# Patient Record
Sex: FEMALE | Race: BLACK OR AFRICAN AMERICAN | NOT HISPANIC OR LATINO | Employment: FULL TIME | URBAN - METROPOLITAN AREA
[De-identification: names, ages, dates, MRNs, and addresses within clinical notes are randomized per-mention and may not be internally consistent; named-entity substitution may affect disease eponyms.]

---

## 2022-05-13 ENCOUNTER — OFFICE VISIT (OUTPATIENT)
Dept: INTERNAL MEDICINE CLINIC | Facility: CLINIC | Age: 57
End: 2022-05-13
Payer: COMMERCIAL

## 2022-05-13 ENCOUNTER — TELEPHONE (OUTPATIENT)
Dept: ADMINISTRATIVE | Facility: OTHER | Age: 57
End: 2022-05-13

## 2022-05-13 ENCOUNTER — APPOINTMENT (OUTPATIENT)
Dept: LAB | Facility: CLINIC | Age: 57
End: 2022-05-13
Payer: COMMERCIAL

## 2022-05-13 VITALS
BODY MASS INDEX: 30.26 KG/M2 | HEART RATE: 90 BPM | DIASTOLIC BLOOD PRESSURE: 88 MMHG | OXYGEN SATURATION: 99 % | SYSTOLIC BLOOD PRESSURE: 146 MMHG | HEIGHT: 63 IN | TEMPERATURE: 97.9 F | WEIGHT: 170.8 LBS | RESPIRATION RATE: 18 BRPM

## 2022-05-13 DIAGNOSIS — Z11.4 SCREENING FOR HIV (HUMAN IMMUNODEFICIENCY VIRUS): ICD-10-CM

## 2022-05-13 DIAGNOSIS — E78.00 HYPERCHOLESTEREMIA: ICD-10-CM

## 2022-05-13 DIAGNOSIS — M54.2 CHRONIC NECK PAIN: ICD-10-CM

## 2022-05-13 DIAGNOSIS — M54.50 CHRONIC BILATERAL LOW BACK PAIN WITHOUT SCIATICA: ICD-10-CM

## 2022-05-13 DIAGNOSIS — H40.053 OCULAR HYPERTENSION, BILATERAL: ICD-10-CM

## 2022-05-13 DIAGNOSIS — G89.29 CHRONIC BILATERAL LOW BACK PAIN WITHOUT SCIATICA: ICD-10-CM

## 2022-05-13 DIAGNOSIS — Z11.59 NEED FOR HEPATITIS C SCREENING TEST: ICD-10-CM

## 2022-05-13 DIAGNOSIS — G89.29 CHRONIC NECK PAIN: ICD-10-CM

## 2022-05-13 DIAGNOSIS — E66.9 OBESITY (BMI 30-39.9): ICD-10-CM

## 2022-05-13 DIAGNOSIS — I10 ESSENTIAL HYPERTENSION: Primary | ICD-10-CM

## 2022-05-13 DIAGNOSIS — Z12.31 ENCOUNTER FOR SCREENING MAMMOGRAM FOR BREAST CANCER: ICD-10-CM

## 2022-05-13 DIAGNOSIS — R10.12 LEFT UPPER QUADRANT ABDOMINAL PAIN: ICD-10-CM

## 2022-05-13 LAB
ALBUMIN SERPL BCP-MCNC: 3.8 G/DL (ref 3.5–5)
ALP SERPL-CCNC: 84 U/L (ref 46–116)
ALT SERPL W P-5'-P-CCNC: 30 U/L (ref 12–78)
ANION GAP SERPL CALCULATED.3IONS-SCNC: 2 MMOL/L (ref 4–13)
AST SERPL W P-5'-P-CCNC: 25 U/L (ref 5–45)
BASOPHILS # BLD MANUAL: 0 THOUSAND/UL (ref 0–0.1)
BASOPHILS NFR MAR MANUAL: 0 % (ref 0–1)
BILIRUB SERPL-MCNC: 0.45 MG/DL (ref 0.2–1)
BUN SERPL-MCNC: 9 MG/DL (ref 5–25)
CALCIUM SERPL-MCNC: 9.6 MG/DL (ref 8.3–10.1)
CHLORIDE SERPL-SCNC: 108 MMOL/L (ref 100–108)
CHOLEST SERPL-MCNC: 185 MG/DL
CO2 SERPL-SCNC: 30 MMOL/L (ref 21–32)
CREAT SERPL-MCNC: 1.02 MG/DL (ref 0.6–1.3)
EOSINOPHIL # BLD MANUAL: 0.03 THOUSAND/UL (ref 0–0.4)
EOSINOPHIL NFR BLD MANUAL: 1 % (ref 0–6)
ERYTHROCYTE [DISTWIDTH] IN BLOOD BY AUTOMATED COUNT: 14.5 % (ref 11.6–15.1)
GFR SERPL CREATININE-BSD FRML MDRD: 61 ML/MIN/1.73SQ M
GLUCOSE P FAST SERPL-MCNC: 89 MG/DL (ref 65–99)
HCT VFR BLD AUTO: 39.7 % (ref 34.8–46.1)
HCV AB SER QL: NORMAL
HDLC SERPL-MCNC: 54 MG/DL
HGB BLD-MCNC: 12.8 G/DL (ref 11.5–15.4)
LDLC SERPL CALC-MCNC: 116 MG/DL (ref 0–100)
LYMPHOCYTES # BLD AUTO: 1.39 THOUSAND/UL (ref 0.6–4.47)
LYMPHOCYTES # BLD AUTO: 43 % (ref 14–44)
MCH RBC QN AUTO: 27.4 PG (ref 26.8–34.3)
MCHC RBC AUTO-ENTMCNC: 32.2 G/DL (ref 31.4–37.4)
MCV RBC AUTO: 85 FL (ref 82–98)
MONOCYTES # BLD AUTO: 0.03 THOUSAND/UL (ref 0–1.22)
MONOCYTES NFR BLD: 1 % (ref 4–12)
NEUTROPHILS # BLD MANUAL: 1.78 THOUSAND/UL (ref 1.85–7.62)
NEUTS SEG NFR BLD AUTO: 55 % (ref 43–75)
NONHDLC SERPL-MCNC: 131 MG/DL
PLATELET # BLD AUTO: 303 THOUSANDS/UL (ref 149–390)
PLATELET BLD QL SMEAR: ADEQUATE
PMV BLD AUTO: 12 FL (ref 8.9–12.7)
POTASSIUM SERPL-SCNC: 3.9 MMOL/L (ref 3.5–5.3)
PROT SERPL-MCNC: 7.6 G/DL (ref 6.4–8.2)
RBC # BLD AUTO: 4.68 MILLION/UL (ref 3.81–5.12)
SODIUM SERPL-SCNC: 140 MMOL/L (ref 136–145)
TRIGL SERPL-MCNC: 74 MG/DL
TSH SERPL DL<=0.05 MIU/L-ACNC: 1.26 UIU/ML (ref 0.45–4.5)
WBC # BLD AUTO: 3.23 THOUSAND/UL (ref 4.31–10.16)

## 2022-05-13 PROCEDURE — 99204 OFFICE O/P NEW MOD 45 MIN: CPT | Performed by: INTERNAL MEDICINE

## 2022-05-13 PROCEDURE — 80061 LIPID PANEL: CPT

## 2022-05-13 PROCEDURE — 85027 COMPLETE CBC AUTOMATED: CPT

## 2022-05-13 PROCEDURE — 86803 HEPATITIS C AB TEST: CPT

## 2022-05-13 PROCEDURE — 87389 HIV-1 AG W/HIV-1&-2 AB AG IA: CPT

## 2022-05-13 PROCEDURE — 36415 COLL VENOUS BLD VENIPUNCTURE: CPT

## 2022-05-13 PROCEDURE — 80053 COMPREHEN METABOLIC PANEL: CPT

## 2022-05-13 PROCEDURE — 84443 ASSAY THYROID STIM HORMONE: CPT

## 2022-05-13 PROCEDURE — 85007 BL SMEAR W/DIFF WBC COUNT: CPT

## 2022-05-13 RX ORDER — LOSARTAN POTASSIUM AND HYDROCHLOROTHIAZIDE 12.5; 5 MG/1; MG/1
1 TABLET ORAL DAILY
Qty: 90 TABLET | Refills: 3 | Status: SHIPPED | OUTPATIENT
Start: 2022-05-13 | End: 2022-06-24

## 2022-05-13 NOTE — TELEPHONE ENCOUNTER
Upon review of the In Basket request and the patient's chart, initial outreach has been made via fax, please see Contacts section for details       Thank you  Oscar Ice

## 2022-05-13 NOTE — LETTER
Procedure Request Form: Colonoscopy      Date Requested: 22  Patient: Alma Aguilar  Patient : 1965   Referring Provider: Dinora Carter, MD        Date of Procedure ______________________________       The above patient has informed us that they have completed their   most recent Colonoscopy at your facility  Please complete   this form and attach all corresponding procedure reports/results  Comments __________________________________________________________  ____________________________________________________________________  ____________________________________________________________________  ____________________________________________________________________    Facility Completing Procedure _________________________________________    Form Completed By (print name) _______________________________________      Signature __________________________________________________________      These reports are needed for  compliance  Please fax this completed form and a copy of the procedure report to our office located at [place] [address] as soon as possible to [phone] gisele Peralta: Phone [phone]    We thank you for your assistance in treating our mutual patient

## 2022-05-13 NOTE — TELEPHONE ENCOUNTER
----- Message from [facility] sent at 5/13/2022 11:21 AM EDT -----  05/13/22 11:21 AM    Hello, our patient Nessa Velásquez has had CRC: Colonoscopy completed/performed  Please assist in updating the patient chart by making an External outreach to [facility] facility located in [place]  The date of service is 08/2020      Thank you,  [address]

## 2022-05-13 NOTE — PROGRESS NOTES
INTERNAL MEDICINE OFFICE VISIT  [facility]  [address], [facility], [facility]  Tel: [phone]      NAME: David Rivera  AGE: 65 y o  SEX: female  : 1965   MRN: [id number]    DATE: 2022  TIME: 10:23 AM      Assessment and Plan:  1  Essential hypertension   patient was started on losartan /hydrochlorothiazide as she has not been taking the medication for a long time  Her blood pressure has been running high but she ran out of her medication and has not seen the doctor for year  She was told to take her medications regularly and continue to monitor her blood pressure  I will see her back in a month  - losartan-hydrochlorothiazide (HYZAAR) 50-12 5 mg per tablet; Take 1 tablet by mouth in the morning  Dispense: 90 tablet; Refill: 3    2  Hypercholesteremia  Continue a low fat diet and check a lipid profile  - CBC and differential; Future  - Comprehensive metabolic panel; Future  - Lipid panel; Future  - TSH, 3rd generation; Future    3  Left upper quadrant abdominal pain   will get back to her with the results of the ultrasound  - US abdomen limited; Future    4  Chronic neck pain    - Ambulatory Referral to Physical Therapy; Future    5  Chronic bilateral low back pain without sciatica    - Ambulatory Referral to Physical Therapy; Future    6  Ocular hypertension, bilateral    - Ambulatory Referral to Ophthalmology; Future    7  Obesity (BMI 30-39  9)  BMI Counseling: Body mass index is 30 26 kg/m²  The BMI is above normal  Nutrition recommendations include decreasing portion sizes, encouraging healthy choices of fruits and vegetables and moderation in carbohydrate intake  Exercise recommendations include moderate physical activity 150 minutes/week  Rationale for BMI follow-up plan is due to patient being overweight or obese  Depression Screening and Follow-up Plan: Patient was screened for depression during today's encounter   They screened negative with a PHQ-2 score of 0         8  Screening for HIV (human immunodeficiency virus)    - HIV 1/2 Antigen/Antibody (4th Generation) w Reflex [facility]; Future    9  Need for hepatitis C screening test    - Hepatitis C Antibody ([facility]); Future    10  Encounter for screening mammogram for breast cancer    - Mammo screening bilateral w 3d & cad; Future      - Counseling Documentation: patient was counseled regarding: diagnostic results, instructions for management, risk factor reductions, prognosis, patient and family education, risks and benefits of treatment options and importance of compliance with treatment  - Medication Side Effects: Adverse side effects of medications were reviewed with the patient/guardian today  Return for follow up visit in  1 month or earlier, if needed  Chief Complaint:  Chief Complaint   Patient presents with    New Patient Visit         History of Present Illness:    this is a new patient was here to establish  She has not seen a doctor for years   Her blood pressure is high as she has not been taking her medication which she was taking in the past     She is not taking anything for the cholesterol but it seems that it was high  She has not had blood work for [employer]   She complains of pain in her left upper abdomen for a while  It comes and goes and has become more frequent now  It is not related to food and is not associated with nausea, vomiting or diarrhea  She complains of pain in her neck and lower back   She needs to see the ophthalmologist for ocular hypertension   She needs to lose weight      Active Problem List:  Patient Active Problem List   Diagnosis    Essential hypertension    Hypercholesteremia    Ocular hypertension, bilateral    Left upper quadrant abdominal pain    Chronic neck pain    Chronic bilateral low back pain without sciatica    Obesity (BMI 30-39  9)         Past Medical History:  Past Medical History:   Diagnosis Date  Hyperlipidemia 2021    Hypertension 1998         Past Surgical History:  History reviewed  No pertinent surgical history        Family History:  Family History   Problem Relation Age of Onset    Hypertension Mother    [facility] Lupus Mother     Seizures Mother    [facility] Glaucoma Mother     Stomach cancer Sister     Alzheimer's disease Maternal Grandmother     No Known Problems Paternal Grandmother          Social History:  Social History     Socioeconomic History    Marital status: /Civil Union     Spouse name: None    Number of children: None    Years of education: None    Highest education level: None   Occupational History    None   Tobacco Use    Smoking status: Never Smoker    Smokeless tobacco: Never Used   Vaping Use    Vaping Use: None   Substance and Sexual Activity    Alcohol use: Never    Drug use: Never    Sexual activity: None   Other Topics Concern    None   Social History Narrative    None     Social Determinants of Health     Financial Resource Strain: Not on file   Food Insecurity: Not on file   Transportation Needs: Not on file   Physical Activity: Not on file   Stress: Not on file   Social Connections: Not on file   Intimate Partner Violence: Not on file   Housing Stability: Not on file         Allergies:  No Known Allergies      Medications:    Current Outpatient Medications:     losartan-hydrochlorothiazide (HYZAAR) 50-12 5 mg per tablet, Take 1 tablet by mouth in the morning , Disp: 90 tablet, Rfl: 3      The following portions of the patient's history were reviewed and updated as appropriate: past medical history, past surgical history, family history, social history, allergies, current medications and active problem list       Review of Systems:  Constitutional: Denies fever, chills, weight gain, weight loss, fatigue  Eyes: Denies eye redness, eye discharge, double vision, change in visual acuity  ENT: Denies hearing loss, tinnitus, sneezing, nasal congestion, nasal discharge, sore throat   Respiratory: Denies cough, expectoration, hemoptysis, shortness of breath, wheezing  Cardiovascular: Denies chest pain, palpitations, lower extremity swelling, orthopnea, PND  Gastrointestinal:  Complains of left upper abdominal pain, denies heartburn, nausea, vomiting, hematemesis, diarrhea, bloody stools  Genito-Urinary: Denies dysuria, frequency, difficulty in micturition, nocturia, incontinence  Musculoskeletal:  Has neck pain, back pain  Neurologic: Denies confusion, lightheadedness, syncope, headache, focal weakness, sensory changes, seizures  Endocrine: Denies polyuria, polydipsia, temperature intolerance  Allergy and Immunology: Denies hives, insect bite sensitivity  Hematological and Lymphatic: Denies bleeding problems, swollen glands   Psychological: Denies depression, suicidal ideation, anxiety, panic, mood swings  Dermatological: Denies pruritus, rash, skin lesion changes      Vitals:  Vitals:    05/13/22 1000   BP: 146/88   Pulse: 90   Resp: 18   Temp: 97 9 °F (36 6 °C)   SpO2: 99%       Body mass index is 30 26 kg/m²  Weight (last 2 days)     Date/Time Weight    05/13/22 1000 77 5 (170 8)            Physical Examination:  General: Patient is not in acute distress  Awake, alert, responding to commands  No weight gain or loss  Head: Normocephalic  Atraumatic  Eyes: Conjunctiva and lids with no swelling, erythema or discharge  Both pupils normal sized, round and reactive to light  Sclera nonicteric  ENT: External examination of nose and ear normal  Otoscopic examination shows translucent tympanic membranes with patent canals without erythema  Oropharynx moist with no erythema, edema, exudate or lesions  Neck: Supple  JVP not raised  Trachea midline  No masses  No thyromegaly  Lungs: No signs of increased work of breathing or respiratory distress  Bilateral bronchovascular breath sounds with no crackles or rhonchi  Chest wall: No tenderness  Cardiovascular: Normal PMI   No thrills  Regular rate and rhythm  S1 and S2 normal  No murmur, rub or gallop  Gastrointestinal: Abdomen soft, nontender  No guarding or rigidity  Liver and spleen not palpable  Bowel sounds present  Neurologic: Cranial nerves II-XII intact  Cortical functions normal  Motor system - Reflexes 2+ and symmetrical  Sensations normal  Musculoskeletal: Gait normal  No joint tenderness  Integumentary: Skin normal with no rash or lesions  Lymphatic: No palpable lymph nodes in neck, axilla or groin  Extremities: No clubbing, cyanosis, edema or varicosities  Psychological: Judgement and insight normal  Mood and affect normal      Laboratory Results:  CBC with diff:   No results found for: WBC, RBC, HGB, HCT, MCV, MCH, RDW, PLT    CMP:  No results found for: CREATININE, BUN, NA, K, CL, CO2, GLUCOSE, PROT, ALKPHOS, ALT, AST, BILIDIR    No results found for: HGBA1C, MG, PHOS    No results found for: TROPONINI, CKMB, CKTOTAL    Lipid Profile:   No results found for: CHOL  No results found for: HDL  No results found for: LDLCALC  No results found for: TRIG    Imaging Results:  No image results found         Health Maintenance:  Health Maintenance   Topic Date Due    Hepatitis C Screening  Never done    HIV Screening  Never done    BMI: Followup Plan  Never done    Annual Physical  Never done    DTaP,Tdap,and Td Vaccines (1 - Tdap) Never done    Cervical Cancer Screening  Never done    Breast Cancer Screening: Mammogram  Never done    Colorectal Cancer Screening  Never done    COVID-19 Vaccine (3 - Booster for Moderna series) 10/13/2021    Influenza Vaccine (Season Ended) 09/01/2022    Depression Screening  05/13/2023    BMI: Adult  05/13/2023    Pneumococcal Vaccine: Pediatrics (0 to 5 Years) and At-Risk Patients (6 to 59 Years)  Aged Out    HIB Vaccine  Aged Out    Hepatitis B Vaccine  Aged Out    IPV Vaccine  Aged Out    Hepatitis A Vaccine  Aged Out    Meningococcal ACWY Vaccine  Aged Out    HPV Vaccine  Aged Out     Immunization History   Administered Date(s) Administered    COVID-19 MODERNA VACC 0 5 ML IM 04/15/2021, 05/13/2021         Johanna Cruz MD  5/13/2022,10:23 AM

## 2022-05-13 NOTE — LETTER
Procedure Request Form: Colonoscopy      Date Requested: 22  Patient: Ingrid Dailey  Patient : 1965   Referring Provider: Alma Chung MD        Date of Procedure ____2020________       The above patient has informed us that they have completed their   most recent Colonoscopy at your facility  Please complete   this form and attach all corresponding procedure reports/results  Comments __________________________________________________________  ____________________________________________________________________  ____________________________________________________________________  ____________________________________________________________________    Facility Completing Procedure _________________________________________    Form Completed By (print name) _______________________________________      Signature __________________________________________________________      These reports are needed for  compliance  Please fax this completed form and a copy of the procedure report to our office located at [place] [address] as soon as possible to [phone] attention Wally Conway: Phone [phone]    We thank you for your assistance in treating our mutual patient

## 2022-05-15 LAB — HIV 1+2 AB+HIV1 P24 AG SERPL QL IA: NORMAL

## 2022-05-23 NOTE — TELEPHONE ENCOUNTER
As a final attempt, a third outreach has been made via telephone call  The outcome of the telephone call was a fax request form to be sent to Office  Please see Contacts section for details  This encounter will be closed and completed by end of day  Should we receive the requested information because of previous outreach attempts, the requested patient's chart will be updated appropriately       Thank you  Celso Lincoln

## 2022-05-24 NOTE — TELEPHONE ENCOUNTER
Upon review of the In Basket request we were informed it may take 2-10 business days to receive requested information  Any additional questions or concerns should be emailed to the Practice Liaisons via [email or web address] email, please do not reply via In Basket      Thank you  Syeda Roach

## 2022-05-25 NOTE — TELEPHONE ENCOUNTER
Upon review of the In Basket request we received a fax requesting a signed HIPAA/MAXINE before they will release medical records  Any additional questions or concerns should be emailed to the Practice Liaisons via [email or web address] email, please do not reply via In Basket      Thank you  Tamia Machuca

## 2022-05-26 ENCOUNTER — HOSPITAL ENCOUNTER (OUTPATIENT)
Dept: MAMMOGRAPHY | Facility: CLINIC | Age: 57
Discharge: HOME | End: 2022-05-26
Payer: COMMERCIAL

## 2022-05-26 DIAGNOSIS — Z12.31 ENCOUNTER FOR SCREENING MAMMOGRAM FOR BREAST CANCER: ICD-10-CM

## 2022-05-26 PROCEDURE — 77063 BREAST TOMOSYNTHESIS BI: CPT

## 2022-05-26 PROCEDURE — 77067 SCR MAMMO BI INCL CAD: CPT

## 2022-06-03 ENCOUNTER — HOSPITAL ENCOUNTER (OUTPATIENT)
Dept: ULTRASOUND IMAGING | Facility: HOSPITAL | Age: 57
Discharge: HOME | End: 2022-06-03
Attending: INTERNAL MEDICINE
Payer: COMMERCIAL

## 2022-06-03 DIAGNOSIS — R10.12 LEFT UPPER QUADRANT ABDOMINAL PAIN: ICD-10-CM

## 2022-06-03 PROCEDURE — 76705 ECHO EXAM OF ABDOMEN: CPT

## 2022-06-09 ENCOUNTER — TELEPHONE (OUTPATIENT)
Dept: INTERNAL MEDICINE CLINIC | Facility: CLINIC | Age: 57
End: 2022-06-09

## 2022-06-09 NOTE — TELEPHONE ENCOUNTER
----- Message from Zachary Gómez DO sent at 6/8/2022  4:58 PM EDT -----  Small 3 mm stone up in left kidney   No obstructing any flow of urine and would not cause any pain

## 2022-06-24 ENCOUNTER — OFFICE VISIT (OUTPATIENT)
Dept: INTERNAL MEDICINE CLINIC | Facility: CLINIC | Age: 57
End: 2022-06-24
Payer: COMMERCIAL

## 2022-06-24 VITALS
TEMPERATURE: 97.6 F | HEIGHT: 63 IN | OXYGEN SATURATION: 97 % | RESPIRATION RATE: 18 BRPM | WEIGHT: 170 LBS | DIASTOLIC BLOOD PRESSURE: 90 MMHG | BODY MASS INDEX: 30.12 KG/M2 | HEART RATE: 86 BPM | SYSTOLIC BLOOD PRESSURE: 148 MMHG

## 2022-06-24 DIAGNOSIS — I10 ESSENTIAL HYPERTENSION: Primary | ICD-10-CM

## 2022-06-24 DIAGNOSIS — Z12.4 ENCOUNTER FOR SCREENING FOR CERVICAL CANCER: ICD-10-CM

## 2022-06-24 PROCEDURE — 99213 OFFICE O/P EST LOW 20 MIN: CPT | Performed by: INTERNAL MEDICINE

## 2022-06-24 RX ORDER — LOSARTAN POTASSIUM AND HYDROCHLOROTHIAZIDE 12.5; 1 MG/1; MG/1
1 TABLET ORAL DAILY
Qty: 90 TABLET | Refills: 3 | Status: SHIPPED | OUTPATIENT
Start: 2022-06-24

## 2022-06-24 NOTE — PROGRESS NOTES
INTERNAL MEDICINE FOLLOW-UP OFFICE VISIT  [facility]    NAME: Ames Lundborg  AGE: 65 y o  SEX: female  : 1965   MRN: [id number]    DATE: 2022  TIME: 9:57 AM    Assessment and Plan     Diagnoses and all orders for this visit:    Essential hypertension  -     losartan-hydrochlorothiazide (HYZAAR) 100-12 5 MG per tablet; Take 1 tablet by mouth daily   the dose of losartan was increased to 100 mg and the hydrochlorothiazide will stay at 12 5 mg daily  She was told to monitor blood pressure at home and I will see her back in 1 month  Encounter for screening for cervical cancer  -     Ambulatory Referral to Obstetrics / Gynecology; Future        - Counseling Documentation: patient was counseled regarding: diagnostic results, instructions for management, risk factor reductions, prognosis, patient and family education, risks and benefits of treatment options and importance of compliance with treatment  - Medication Side Effects: Adverse side effects of medications were reviewed with the patient/guardian today  Return to office in:  1 month    Chief Complaint     Chief Complaint   Patient presents with    Follow-up     4 week w lab, Discuss ultrasound        History of Present Illness     HPI  She says she has been taking her medication regularly but her blood pressure is still on the higher side  She says her blood pressure at home is much better  She will get her blood pressure instrument with her next time  The following portions of the patient's history were reviewed and updated as appropriate: allergies, current medications, past family history, past medical history, past social history, past surgical history and problem list     Review of Systems     Review of Systems   Constitutional: Negative for chills, diaphoresis, fatigue and fever     HENT: Negative for congestion, ear discharge, ear pain, hearing loss, postnasal drip, rhinorrhea, sinus pressure, sinus pain, sneezing, sore throat and voice change  Eyes: Negative for pain, discharge, redness and visual disturbance  Respiratory: Negative for cough, chest tightness, shortness of breath and wheezing  Cardiovascular: Negative for chest pain, palpitations and leg swelling  Gastrointestinal: Negative for abdominal distention, abdominal pain, blood in stool, constipation, diarrhea, nausea and vomiting  Endocrine: Negative for cold intolerance, heat intolerance, polydipsia, polyphagia and polyuria  Genitourinary: Negative for dysuria, flank pain, frequency, hematuria and urgency  Musculoskeletal: Positive for back pain  Negative for arthralgias, gait problem, joint swelling, myalgias, neck pain and neck stiffness  Skin: Negative for rash  Neurological: Negative for dizziness, tremors, syncope, facial asymmetry, speech difficulty, weakness, light-headedness, numbness and headaches  Hematological: Does not bruise/bleed easily  Psychiatric/Behavioral: Negative for behavioral problems, confusion and sleep disturbance  The patient is not nervous/anxious  Active Problem List     Patient Active Problem List   Diagnosis    Essential hypertension    Hypercholesteremia    Ocular hypertension, bilateral    Left upper quadrant abdominal pain    Chronic neck pain    Chronic bilateral low back pain without sciatica    Obesity (BMI 30-39  9)       Objective     /90 (BP Location: Left arm, Patient Position: Sitting, Cuff Size: Standard)   Pulse 86   Temp 97 6 °F (36 4 °C) (Tympanic)   Resp 18   Ht 5' 3" (1 6 m)   Wt 77 1 kg (170 lb)   SpO2 97%   BMI 30 11 kg/m²     Physical Exam  Constitutional:       General: She is not in acute distress  Appearance: She is well-developed  She is not diaphoretic  HENT:      Head: Normocephalic and atraumatic        Right Ear: External ear normal       Left Ear: External ear normal       Nose: Nose normal    Eyes:      General: No scleral icterus  Right eye: No discharge  Left eye: No discharge  Conjunctiva/sclera: Conjunctivae normal    Neck:      Thyroid: No thyromegaly  Vascular: No JVD  Trachea: No tracheal deviation  Cardiovascular:      Rate and Rhythm: Normal rate and regular rhythm  Heart sounds: Normal heart sounds  No murmur heard  No friction rub  No gallop  Pulmonary:      Effort: Pulmonary effort is normal  No respiratory distress  Breath sounds: Normal breath sounds  No wheezing or rales  Chest:      Chest wall: No tenderness  Abdominal:      General: Bowel sounds are normal  There is no distension  Palpations: Abdomen is soft  Tenderness: There is no abdominal tenderness  There is no guarding or rebound  Musculoskeletal:         General: No tenderness  Normal range of motion  Cervical back: Normal range of motion and neck supple  Lymphadenopathy:      Cervical: No cervical adenopathy  Skin:     General: Skin is warm and dry  Findings: No erythema or rash  Neurological:      Mental Status: She is alert and oriented to person, place, and time  Cranial Nerves: No cranial nerve deficit  Motor: No abnormal muscle tone        Coordination: Coordination normal    Psychiatric:         Judgment: Judgment normal              Current Medications       Current Outpatient Medications:     losartan-hydrochlorothiazide (HYZAAR) 100-12 5 MG per tablet, Take 1 tablet by mouth daily, Disp: 90 tablet, Rfl: 3    Health Maintenance     Health Maintenance   Topic Date Due    Annual Physical  Never done    Cervical Cancer Screening  Never done    Colorectal Cancer Screening  Never done    DTaP,Tdap,and Td Vaccines (1 - Tdap) 06/17/2020    COVID-19 Vaccine (3 - Booster for Moderna series) 10/13/2021    Influenza Vaccine (Season Ended) 09/01/2022    Depression Screening  05/13/2023    BMI: Followup Plan  05/13/2023    Breast Cancer Screening: Mammogram  05/26/2023  BMI: Adult  06/24/2023    HIV Screening  Completed    Hepatitis C Screening  Completed    Pneumococcal Vaccine: Pediatrics (0 to 5 Years) and At-Risk Patients (6 to 59 Years)  Aged Out    HIB Vaccine  Aged Out    Hepatitis B Vaccine  Aged Out    IPV Vaccine  Aged Out    Hepatitis A Vaccine  Aged Out    Meningococcal ACWY Vaccine  Aged Out    HPV Vaccine  Aged [employer] History   Administered Date(s) Administered    COVID-19 MODERNA VACC 0 5 ML IM 04/15/2021, 05/13/2021    Td (adult), adsorbed 06/16/2020         Karina Quintanilla MD  [address] [facility]IC[facility] AT [facility]

## 2022-08-05 ENCOUNTER — TELEPHONE (OUTPATIENT)
Dept: PHYSICAL THERAPY | Facility: OTHER | Age: 57
End: 2022-08-05

## 2022-08-05 ENCOUNTER — OFFICE VISIT (OUTPATIENT)
Dept: INTERNAL MEDICINE CLINIC | Facility: CLINIC | Age: 57
End: 2022-08-05
Payer: COMMERCIAL

## 2022-08-05 VITALS
TEMPERATURE: 97.7 F | BODY MASS INDEX: 30.48 KG/M2 | HEART RATE: 80 BPM | HEIGHT: 63 IN | OXYGEN SATURATION: 99 % | SYSTOLIC BLOOD PRESSURE: 128 MMHG | DIASTOLIC BLOOD PRESSURE: 84 MMHG | WEIGHT: 172 LBS | RESPIRATION RATE: 16 BRPM

## 2022-08-05 DIAGNOSIS — M54.2 CHRONIC NECK PAIN: ICD-10-CM

## 2022-08-05 DIAGNOSIS — G89.29 CHRONIC NECK PAIN: ICD-10-CM

## 2022-08-05 DIAGNOSIS — I10 ESSENTIAL HYPERTENSION: Primary | ICD-10-CM

## 2022-08-05 PROCEDURE — 99213 OFFICE O/P EST LOW 20 MIN: CPT | Performed by: INTERNAL MEDICINE

## 2022-08-05 NOTE — PROGRESS NOTES
INTERNAL MEDICINE FOLLOW-UP OFFICE VISIT  [facility]    NAME: Raheem Katz  AGE: 65 y o  SEX: female  : 1965   MRN: [id number]    DATE: 2022  TIME: 9:35 AM    Assessment and Plan     Diagnoses and all orders for this visit:    Essential hypertension  -     CBC and differential; Future  -     Comprehensive metabolic panel; Future  -     Lipid panel; Future  -     TSH, 3rd generation; Future   was advised to continue the same dose of losartan and hydrochlorothiazide for now  She will continue to monitor blood pressure and I will see her back in about 4 months  Chronic neck pain  -     XR spine cervical complete 4 or 5 vw non injury; Future  -     Ambulatory Referral to Comprehensive Spine Program; Future        - Counseling Documentation: patient was counseled regarding: instructions for management, risk factor reductions, prognosis, patient and family education, risks and benefits of treatment options and importance of compliance with treatment  - Medication Side Effects: Adverse side effects of medications were reviewed with the patient/guardian today  Return to office in:  4 months    Chief Complaint     Chief Complaint   Patient presents with    Follow-up     1 month        History of Present Illness     HPI  Blood pressure is much better controlled after the losartan was increased to 100 mg  She continues to complain of pain in her left upper back       The following portions of the patient's history were reviewed and updated as appropriate: allergies, current medications, past family history, past medical history, past social history, past surgical history and problem list     Review of Systems     Review of Systems   Constitutional: Negative for chills, diaphoresis, fatigue and fever  HENT: Negative for congestion, ear discharge, ear pain, hearing loss, postnasal drip, rhinorrhea, sinus pressure, sinus pain, sneezing, sore throat and voice change  Eyes: Negative for pain, discharge, redness and visual disturbance  Respiratory: Negative for cough, chest tightness, shortness of breath and wheezing  Cardiovascular: Negative for chest pain, palpitations and leg swelling  Gastrointestinal: Negative for abdominal distention, abdominal pain, blood in stool, constipation, diarrhea, nausea and vomiting  Endocrine: Negative for cold intolerance, heat intolerance, polydipsia, polyphagia and polyuria  Genitourinary: Negative for dysuria, flank pain, frequency, hematuria and urgency  Musculoskeletal: Positive for back pain and neck pain  Negative for arthralgias, gait problem, joint swelling, myalgias and neck stiffness  Skin: Negative for rash  Neurological: Negative for dizziness, tremors, syncope, facial asymmetry, speech difficulty, weakness, light-headedness, numbness and headaches  Hematological: Does not bruise/bleed easily  Psychiatric/Behavioral: Negative for behavioral problems, confusion and sleep disturbance  The patient is not nervous/anxious  Active Problem List     Patient Active Problem List   Diagnosis    Essential hypertension    Hypercholesteremia    Ocular hypertension, bilateral    Left upper quadrant abdominal pain    Chronic neck pain    Chronic bilateral low back pain without sciatica    Obesity (BMI 30-39  9)       Objective     /84 (BP Location: Left arm, Patient Position: Sitting, Cuff Size: Standard)   Pulse 80   Temp 97 7 °F (36 5 °C) (Tympanic)   Resp 16   Ht 5' 3" (1 6 m)   Wt 78 kg (172 lb)   SpO2 99%   BMI 30 47 kg/m²     Physical Exam  Constitutional:       General: She is not in acute distress  Appearance: She is well-developed  She is not diaphoretic  HENT:      Head: Normocephalic and atraumatic  Right Ear: External ear normal       Left Ear: External ear normal       Nose: Nose normal    Eyes:      General: No scleral icterus  Right eye: No discharge           Left eye: No discharge  Conjunctiva/sclera: Conjunctivae normal    Neck:      Thyroid: No thyromegaly  Vascular: No JVD  Trachea: No tracheal deviation  Cardiovascular:      Rate and Rhythm: Normal rate and regular rhythm  Heart sounds: Normal heart sounds  No murmur heard  No friction rub  No gallop  Pulmonary:      Effort: Pulmonary effort is normal  No respiratory distress  Breath sounds: Normal breath sounds  No wheezing or rales  Chest:      Chest wall: No tenderness  Abdominal:      General: Bowel sounds are normal  There is no distension  Palpations: Abdomen is soft  Tenderness: There is no abdominal tenderness  There is no guarding or rebound  Musculoskeletal:         General: Tenderness present  Normal range of motion  Cervical back: Normal range of motion and neck supple  Lymphadenopathy:      Cervical: No cervical adenopathy  Skin:     General: Skin is warm and dry  Findings: No erythema or rash  Neurological:      Mental Status: She is alert and oriented to person, place, and time  Cranial Nerves: No cranial nerve deficit  Motor: No abnormal muscle tone        Coordination: Coordination normal    Psychiatric:         Judgment: Judgment normal              Current Medications       Current Outpatient Medications:     losartan-hydrochlorothiazide (HYZAAR) 100-12 5 MG per tablet, Take 1 tablet by mouth daily, Disp: 90 tablet, Rfl: 3    Health Maintenance     Health Maintenance   Topic Date Due    Annual Physical  Never done    Cervical Cancer Screening  Never done    Colorectal Cancer Screening  Never done    DTaP,Tdap,and Td Vaccines (1 - Tdap) 06/17/2020    COVID-19 Vaccine (3 - Booster for Moderna series) 10/13/2021    Influenza Vaccine (1) 09/01/2022    Depression Screening  05/13/2023    BMI: Followup Plan  05/13/2023    Breast Cancer Screening: Mammogram  05/26/2023    BMI: Adult  08/05/2023    HIV Screening  Completed    Hepatitis C Screening  Completed    Pneumococcal Vaccine: Pediatrics (0 to 5 Years) and At-Risk Patients (6 to 59 Years)  Aged Out    HIB Vaccine  Aged Out    Hepatitis B Vaccine  Aged Out    IPV Vaccine  Aged Out    Hepatitis A Vaccine  Aged Out    Meningococcal ACWY Vaccine  Aged Out    HPV Vaccine  Aged [employer] History   Administered Date(s) Administered    COVID-19 MODERNA VACC 0 5 ML IM 04/15/2021, 05/13/2021    Td (adult), adsorbed 06/16/2020         Wolf Thurston MD  [address] [facility]

## 2022-08-15 ENCOUNTER — HOSPITAL ENCOUNTER (OUTPATIENT)
Dept: RADIOLOGY | Facility: HOSPITAL | Age: 57
Discharge: HOME | End: 2022-08-15
Payer: COMMERCIAL

## 2022-08-15 ENCOUNTER — HOSPITAL ENCOUNTER (OUTPATIENT)
Dept: RADIOLOGY | Facility: HOSPITAL | Age: 57
End: 2022-08-15
Payer: COMMERCIAL

## 2022-08-15 DIAGNOSIS — M54.2 CHRONIC NECK PAIN: ICD-10-CM

## 2022-08-15 DIAGNOSIS — G89.29 CHRONIC NECK PAIN: ICD-10-CM

## 2022-08-15 PROCEDURE — 72050 X-RAY EXAM NECK SPINE 4/5VWS: CPT

## 2022-08-18 ENCOUNTER — TELEPHONE (OUTPATIENT)
Dept: PHYSICAL THERAPY | Facility: OTHER | Age: 57
End: 2022-08-18

## 2022-08-18 NOTE — TELEPHONE ENCOUNTER
Patient returned call to [facility]  She said she just had an xray done on 8/15/22 and would like to get those results before being triaged for evaluation at PT       Referral closed  Will triage if/when patient calls back

## 2022-08-18 NOTE — TELEPHONE ENCOUNTER
Returned patient's call she placed to [facility] on 8/18/22 @ 9:51AM  She was unable to talk b/c she was driving  Patient said she will call us back when able to

## 2022-08-22 ENCOUNTER — TELEPHONE (OUTPATIENT)
Dept: INTERNAL MEDICINE CLINIC | Facility: CLINIC | Age: 57
End: 2022-08-22

## 2022-08-22 NOTE — TELEPHONE ENCOUNTER
----- Message from Carri Ron DO sent at 8/20/2022  6:03 AM EDT -----  No bony abnormalities on x-ray  Possible muscle spasm noted based on the straightening of her cervical spine   The recommendation here for pain would be for her to follow-up with physical therapy

## 2022-08-29 ENCOUNTER — OFFICE VISIT (OUTPATIENT)
Dept: OBGYN CLINIC | Age: 57
End: 2022-08-29
Payer: COMMERCIAL

## 2022-08-29 VITALS
BODY MASS INDEX: 30.55 KG/M2 | WEIGHT: 172.4 LBS | SYSTOLIC BLOOD PRESSURE: 124 MMHG | DIASTOLIC BLOOD PRESSURE: 86 MMHG | HEIGHT: 63 IN

## 2022-08-29 DIAGNOSIS — Z12.4 ENCOUNTER FOR SCREENING FOR CERVICAL CANCER: ICD-10-CM

## 2022-08-29 DIAGNOSIS — Z01.419 ENCOUNTER FOR ANNUAL ROUTINE GYNECOLOGICAL EXAMINATION: Primary | ICD-10-CM

## 2022-08-29 DIAGNOSIS — Z12.4 SCREENING FOR CERVICAL CANCER: ICD-10-CM

## 2022-08-29 DIAGNOSIS — Z12.31 ENCOUNTER FOR SCREENING MAMMOGRAM FOR MALIGNANT NEOPLASM OF BREAST: ICD-10-CM

## 2022-08-29 PROCEDURE — G0476 HPV COMBO ASSAY CA SCREEN: HCPCS

## 2022-08-29 PROCEDURE — 99386 PREV VISIT NEW AGE 40-64: CPT

## 2022-08-29 PROCEDURE — G0145 SCR C/V CYTO,THINLAYER,RESCR: HCPCS

## 2022-08-29 NOTE — PATIENT INSTRUCTIONS
Breast Self Exam for Women   AMBULATORY CARE:   A breast self-exam (BSE)  is a way to check your breasts for lumps and other changes  Regular BSEs can help you know how your breasts normally look and feel  Most breast lumps or changes are not cancer, but you should always have them checked by a healthcare provider  Why you should do a BSE:  Breast cancer is the most common type of cancer in women  Even if you have mammograms, you may still want to do a BSE regularly  If you know how your breasts normally feel and look, it may help you know when to contact your healthcare provider  Mammograms can miss some cancers  You may find a lump during a BSE that did not show up on a mammogram   When you should do a BSE:  If you have periods, you may want to do your BSE 1 week after your period ends  This is the time when your breasts may be the least swollen, lumpy, or tender  You can do regular BSEs even if you are breastfeeding or have breast implants  Call your doctor if:   You find any lumps or changes in your breasts  You have breast pain or fluid coming from your nipples  You have questions or concerns about your condition or care  How to do a BSE:       Look at your breasts in a mirror  Look at the size and shape of each breast and nipple  Check for swelling, lumps, dimpling, scaly skin, or other skin changes  Look for nipple changes, such as a nipple that is painful or beginning to pull inward  Gently squeeze both nipples and check to see if fluid (that is not breast milk) comes out of them  If you find any of these or other breast changes, contact your healthcare provider  Check your breasts while you sit or  the following 3 positions:    [place] your arms down at your sides  Raise your hands and join them behind your head  Put firm pressure with your hands on your hips  Bend slightly forward while you look at your breasts in the mirror  Lie down and feel your breasts    When you lie down, your breast tissue spreads out evenly over your chest  This makes it easier for you to feel for lumps and anything that may not be normal for your breasts  Do a BSE on one breast at a time  Place a small pillow or towel under your left shoulder  Put your left arm behind your head  Use the 3 middle fingers of your right hand  Use your fingertip pads, on the top of your fingers  Your fingertip pad is the most sensitive part of your finger  Use small circles to feel your breast tissue  Use your fingertip pads to make dime-sized, overlapping circles on your breast and armpits  Use light, medium, and firm pressure  First, press lightly  Second, press with medium pressure to feel a little deeper into the breast  Last, use firm pressure to feel deep within your breast     Examine your entire breast area  Examine the breast area from above the breast to below the breast where you feel only ribs  Make small circles with your fingertips, starting in the middle of your armpit  Make circles going up and down the breast area  Continue toward your breast and all the way across it  Examine the area from your armpit all the way over to the middle of your chest (breastbone)  Stop at the middle of your chest     Move the pillow or towel to your right shoulder, and put your right arm behind your head  Use the 3 fingertip pads of your left hand, and repeat the above steps to do a BSE on your right breast     What else you can do to check for breast problems or cancer:  Talk to your healthcare provider about mammograms  A mammogram is an x-ray of your breasts to screen for breast cancer or other problems  Your provider can tell you the benefits and risks of mammograms  The first mammogram is usually at age 39 or 48  Your provider may recommend you start at 36 or younger if your risk for breast cancer is high  Mammograms usually continue every 1 to 2 years until age 76         Follow up with your doctor as directed:  Write down your questions so you remember to ask them during your visits  © Copyright [employer] 2022 Information is for End User's use only and may not be sold, redistributed or otherwise used for commercial purposes  All illustrations and images included in CareNotes® are the copyrighted property of [employer] D A M [employer] Inc  or S[employer]on Bird  The above information is an  only  It is not intended as medical advice for individual conditions or treatments  Talk to your doctor, nurse or pharmacist before following any medical regimen to see if it is safe and effective for you  Wellness Visit for Adults   AMBULATORY CARE:   A wellness visit  is when you see your healthcare provider to get screened for health problems  Your healthcare provider will also give you advice on how to stay healthy  Write down your questions so you remember to ask them  Ask your healthcare provider how often you should have a wellness visit  What happens at a wellness visit:  Your healthcare provider will ask about your health, and your family history of health problems  This includes high blood pressure, heart disease, and cancer  He or she will ask if you have symptoms that concern you, if you smoke, and about your mood  You may also be asked about your intake of medicines, supplements, food, and alcohol  Any of the following may be done: Your weight  will be checked  Your height may also be checked so your body mass index (BMI) can be calculated  Your BMI shows if you are at a healthy weight  Your blood pressure  and heart rate will be checked  Your temperature may also be checked  Blood and urine tests  may be done  Blood tests may be done to check your cholesterol levels  Abnormal cholesterol levels increase your risk for heart disease and stroke  You may also need a blood or urine test to check for diabetes if you are at increased risk  Urine tests may be done to look for signs of an infection or kidney disease      A physical exam  includes checking your heartbeat and lungs with a stethoscope  Your healthcare provider may also check your skin to look for sun damage  Screening tests  may be recommended  A screening test is done to check for diseases that may not cause symptoms  The screening tests you may need depend on your age, gender, family history, and lifestyle habits  For example, colorectal screening may be recommended if you are 48years old or older  Screening tests you need if you are a woman:   A Pap smear  is used to screen for cervical cancer  Pap smears are usually done every 3 to 5 years depending on your age  You may need them more often if you have had abnormal Pap smear test results in the past  Ask your healthcare provider how often you should have a Pap smear  A mammogram  is an x-ray of your breasts to screen for breast cancer  Experts recommend mammograms every 2 years starting at age 48 years  You may need a mammogram at age 52 years or younger if you have an increased risk for breast cancer  Talk to your healthcare provider about when you should start having mammograms and how often you need them  Vaccines you may need:   Get an influenza vaccine  every year  The influenza vaccine protects you from the flu  Several types of viruses cause the flu  The viruses change over time, so new vaccines are made each year  Get a tetanus-diphtheria (Td) booster vaccine  every 10 years  This vaccine protects you against tetanus and diphtheria  Tetanus is a severe infection that may cause painful muscle spasms and lockjaw  Diphtheria is a severe bacterial infection that causes a thick covering in the back of your mouth and throat  Get a human papillomavirus (HPV) vaccine  if you are female and aged 23 to 32 or male 23 to 24 and never received it  This vaccine protects you from HPV infection  HPV is the most common infection spread by sexual contact   HPV may also cause vaginal, penile, and anal cancers  Get a pneumococcal vaccine  if you are aged 72 years or older  The pneumococcal vaccine is an injection given to protect you from pneumococcal disease  Pneumococcal disease is an infection caused by pneumococcal bacteria  The infection may cause pneumonia, meningitis, or an ear infection  Get a shingles vaccine  if you are 60 or older, even if you have had shingles before  The shingles vaccine is an injection to protect you from the varicella-zoster virus  This is the same virus that causes chickenpox  Shingles is a painful rash that develops in people who had chickenpox or have been exposed to the virus  How to eat healthy:  My Plate is a model for planning healthy meals  It shows the types and amounts of foods that should go on your plate  Fruits and vegetables make up about half of your plate, and grains and protein make up the other half  A serving of dairy is included on the side of your plate  The amount of calories and serving sizes you need depends on your age, gender, weight, and height  Examples of healthy foods are listed below:  Eat a variety of vegetables  such as dark green, red, and orange vegetables  You can also include canned vegetables low in sodium (salt) and frozen vegetables without added butter or sauces  Eat a variety of fresh fruits , canned fruit in 100% juice, frozen fruit, and dried fruit  Include whole grains  At least half of the grains you eat should be whole grains  Examples include whole-wheat bread, wheat pasta, brown rice, and whole-grain cereals such as oatmeal     Eat a variety of protein foods such as seafood (fish and shellfish), lean meat, and poultry without skin (turkey and chicken)  Examples of lean meats include pork leg, shoulder, or tenderloin, and beef round, sirloin, tenderloin, and extra lean ground beef  Other protein foods include eggs and egg substitutes, beans, peas, soy products, nuts, and seeds      Choose low-fat dairy products such as skim or 1% milk or low-fat yogurt, cheese, and cottage cheese  Limit unhealthy fats  such as butter, hard margarine, and shortening  Exercise:  Exercise at least 30 minutes per day on most days of the week  Some examples of exercise include walking, biking, dancing, and swimming  You can also fit in more physical activity by taking the stairs instead of the elevator or parking farther away from stores  Include muscle strengthening activities 2 days each week  Regular exercise provides many health benefits  It helps you manage your weight, and decreases your risk for type 2 diabetes, heart disease, stroke, and high blood pressure  Exercise can also help improve your mood  Ask your healthcare provider about the best exercise plan for you  General health and safety guidelines:   Do not smoke  Nicotine and other chemicals in cigarettes and cigars can cause lung damage  Ask your healthcare provider for information if you currently smoke and need help to quit  E-cigarettes or smokeless tobacco still contain nicotine  Talk to your healthcare provider before you use these products  Limit alcohol  A drink of alcohol is 12 ounces of beer, 5 ounces of wine, or 1½ ounces of liquor  Lose weight, if needed  Being overweight increases your risk of certain health conditions  These include heart disease, high blood pressure, type 2 diabetes, and certain types of cancer  Protect your skin  Do not sunbathe or use tanning beds  Use sunscreen with a SPF 15 or higher  Apply sunscreen at least 15 minutes before you go outside  Reapply sunscreen every 2 hours  Wear protective clothing, hats, and sunglasses when you are outside  Drive safely  Always wear your seatbelt  Make sure everyone in your car wears a seatbelt  A seatbelt can save your life if you are in an accident  Do not use your cell phone when you are driving  This could distract you and cause an accident   Pull over if you need to make a call or send a text message  Practice safe sex  Use latex condoms if are sexually active and have more than one partner  Your healthcare provider may recommend screening tests for sexually transmitted infections (STIs)  Wear helmets, lifejackets, and protective gear  Always wear a helmet when you ride a bike or motorcycle, go skiing, or play sports that could cause a head injury  Wear protective equipment when you play sports  Wear a lifejacket when you are on a boat or doing water sports  © Copyright [employer] 2022 Information is for End User's use only and may not be sold, redistributed or otherwise used for commercial purposes  All illustrations and images included in CareNotes® are the copyrighted property of [employer] A M , Inc  or [facility] Tierra Gonzalez   The above information is an  only  It is not intended as medical advice for individual conditions or treatments  Talk to your doctor, nurse or pharmacist before following any medical regimen to see if it is safe and effective for you

## 2022-08-29 NOTE — PROGRESS NOTES
Kvngyoavemily was seen today for gynecologic exam     Diagnoses and all orders for this visit:    Encounter for annual routine gynecological examination    Screening for cervical cancer  -     Liquid-based pap, screening    Encounter for screening mammogram for malignant neoplasm of breast  -     Mammo screening bilateral w 3d & cad; Future      ASCCP guidelines reviewed  Pap collected today  Perineal hygiene reviewed  Kegel exercises recommended  SBE, daily exercise and healthy diet with adequate calcium and vitamin D encouraged  Weight bearing exercises a minimum of 150 minutes/week advised  Yearly mammograms advised  Stay current with all recommended health and wellness screenings  Advised to call with any issues, all concerns & questions addressed  See provided information in your after visit summary     F/U annually     Results will be released to [employer], if abnormal will call or message to review and discuss treatment plan  Health Maintenance:    Last PAP: 2020 Results are unavailable  Patient denies history of abnormal paps  Reports a history of cervical polyps  Next PAP Due: done today    Last Mammogram: 2022  Results were: BI-RADS 1  Next Mammogram: 2023    Last Colonoscopy: 2020      Subjective    CC: Yearly Exam     María Hughes is a 65 y o  postmenopausal female , here for annual exam   Feels well overall, no concerns today  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  GYN hx includes:  Denies history of abnormal pap smears or mammograms  Denies FHx of breast, uterine, ovarian, or colon cancer  Denies any post menopausal bleeding, breast concerns, vaginal issues, pelvic pain, dyspareunia, urinary symptoms, symptoms of pelvic organ prolapse, urinary, or fecal incontinence today  She is sexually active  Denies intimate partner violence  STD testing:  She does not want STD testing today        Past Medical History:   Diagnosis Date    Hyperlipidemia     Hypertension      History reviewed  No pertinent surgical history  Family History   Problem Relation Age of Onset    Hypertension Mother    Funmilayo Coombs Lupus Mother     Seizures Mother    Funmilayo Coombs Glaucoma Mother     Stomach cancer Sister     No Known Problems Sister     No Known Problems Sister     No Known Problems Daughter     Alzheimer's disease Maternal Grandmother     No Known Problems Paternal Grandmother     No Known Problems Maternal Aunt     No Known Problems Paternal Aunt     No Known Problems Paternal Aunt     No Known Problems Paternal Aunt     Breast cancer Neg Hx     Colon cancer Neg Hx     Ovarian cancer Neg Hx     Uterine cancer Neg Hx     Cervical cancer Neg Hx      Social History     Tobacco Use    Smoking status: Never Smoker    Smokeless tobacco: Never Used   Vaping Use    Vaping Use: Never used   Substance Use Topics    Alcohol use: Not Currently    Drug use: Never       Current Outpatient Medications:     losartan-hydrochlorothiazide (HYZAAR) 100-12 5 MG per tablet, Take 1 tablet by mouth daily, Disp: 90 tablet, Rfl: 3  Patient Active Problem List    Diagnosis Date Noted    Left upper quadrant abdominal pain 2022    Chronic neck pain 2022    Chronic bilateral low back pain without sciatica 2022    Obesity (BMI 30-39 9) 2022    Hypercholesteremia 2021    Ocular hypertension, bilateral 2021    Essential hypertension 2020       No Known Allergies    OB History    Para Term  AB Living   2 2 2   0 2   SAB IAB Ectopic Multiple Live Births   0 0 0 0 2      # Outcome Date GA Lbr Omid/2nd Weight Sex Delivery Anes PTL Lv   2 Term            1 Term                Vitals:    22 0653   BP: 124/86   BP Location: Left arm   Patient Position: Sitting   Cuff Size: Large   Weight: 78 2 kg (172 lb 6 4 oz)   Height: 5' 3" (1 6 m)     Body mass index is 30 54 kg/m²        Review of Systems   Constitutional: Negative for chills, fatigue, fever and unexpected weight change  Respiratory: Negative for cough and shortness of breath  Cardiovascular: Negative for chest pain, palpitations and leg swelling  Gastrointestinal: Negative for abdominal distention, abdominal pain, blood in stool, constipation, diarrhea and nausea  Endocrine: Negative  Genitourinary: Negative for difficulty urinating, dyspareunia, dysuria, frequency, hematuria, menstrual problem, pelvic pain, urgency, vaginal bleeding, vaginal discharge and vaginal pain  Musculoskeletal: Negative for back pain and myalgias  Skin: Negative for rash  Neurological: Negative for headaches  Psychiatric/Behavioral: Negative for confusion and dysphoric mood  All other systems reviewed and are negative  Physical Exam  Constitutional:       General: She is not in acute distress  Appearance: Normal appearance  She is not ill-appearing  Genitourinary:      Bladder and urethral meatus normal       No lesions in the vagina  Right Labia: No rash, tenderness, lesions, skin changes or Bartholin's cyst      Left Labia: No tenderness, lesions, skin changes, Bartholin's cyst or rash  No inguinal adenopathy present in the right or left side  No vaginal discharge, erythema, tenderness, bleeding or ulceration  No vaginal prolapse present  Mild vaginal atrophy present  Right Adnexa: not tender, not full and no mass present  Left Adnexa: not tender, not full and no mass present  Cervix is parous  No cervical motion tenderness, discharge, friability, lesion, polyp, nabothian cyst, eversion or elongation  Uterus is not enlarged, fixed, tender or prolapsed  No uterine mass detected  Uterus is midaxial       Urethral meatus caruncle not present  No urethral prolapse, tenderness, mass or discharge present  Pelvic exam was performed with patient in the lithotomy position     Breasts:      Right: No swelling, inverted nipple, mass, nipple discharge, skin change, tenderness, axillary adenopathy or supraclavicular adenopathy  Left: No swelling, inverted nipple, mass, nipple discharge, skin change, tenderness, axillary adenopathy or supraclavicular adenopathy  HENT:      Head: Normocephalic and atraumatic  Eyes:      Conjunctiva/sclera: Conjunctivae normal    Cardiovascular:      Rate and Rhythm: Normal rate and regular rhythm  Pulmonary:      Effort: Pulmonary effort is normal       Breath sounds: Normal breath sounds  Abdominal:      General: There is no distension  Palpations: Abdomen is soft  Tenderness: There is no abdominal tenderness  Musculoskeletal:         General: Normal range of motion  Cervical back: Normal range of motion and neck supple  Lymphadenopathy:      Upper Body:      Right upper body: No supraclavicular or axillary adenopathy  Left upper body: No supraclavicular or axillary adenopathy  Lower Body: No right inguinal adenopathy  No left inguinal adenopathy  Neurological:      Mental Status: She is alert and oriented to person, place, and time  Skin:     General: Skin is warm and dry  Psychiatric:         Mood and Affect: Mood normal          Behavior: Behavior normal          Thought Content: Thought content normal    Vitals and nursing note reviewed

## 2022-08-29 NOTE — PROGRESS NOTES
Patient is here today for a gynecological annual exam    No questions or concerns today  LMP: Postmenopausal   Menopausal age: 40      Last pap: 2020     Hx of abnormal paps? No  Last Mammo: 2022  Last Colonoscopy: States she has had done but isn't due for another 7 years

## 2022-08-30 LAB
HPV HR 12 DNA CVX QL NAA+PROBE: NEGATIVE
HPV16 DNA CVX QL NAA+PROBE: NEGATIVE
HPV18 DNA CVX QL NAA+PROBE: NEGATIVE

## 2022-09-01 LAB
LAB AP GYN PRIMARY INTERPRETATION: NORMAL
Lab: NORMAL

## 2023-02-25 DIAGNOSIS — I10 ESSENTIAL HYPERTENSION: ICD-10-CM

## 2023-02-27 RX ORDER — LOSARTAN POTASSIUM AND HYDROCHLOROTHIAZIDE 12.5; 1 MG/1; MG/1
TABLET ORAL
Qty: 30 TABLET | Refills: 3 | Status: SHIPPED | OUTPATIENT
Start: 2023-02-27

## 2023-03-17 RX ORDER — LATANOPROST 50 UG/ML
SOLUTION/ DROPS OPHTHALMIC
COMMUNITY
Start: 2023-02-20

## 2023-03-17 RX ORDER — PANTOPRAZOLE SODIUM 40 MG/1
40 TABLET, DELAYED RELEASE ORAL DAILY
COMMUNITY
Start: 2023-02-08 | End: 2023-03-21 | Stop reason: SDUPTHER

## 2023-03-17 RX ORDER — BRIMONIDINE TARTRATE AND TIMOLOL MALEATE 2; 5 MG/ML; MG/ML
SOLUTION OPHTHALMIC
COMMUNITY
Start: 2023-02-20

## 2023-03-21 ENCOUNTER — OFFICE VISIT (OUTPATIENT)
Dept: GASTROENTEROLOGY | Facility: CLINIC | Age: 58
End: 2023-03-21

## 2023-03-21 VITALS
SYSTOLIC BLOOD PRESSURE: 124 MMHG | BODY MASS INDEX: 31.33 KG/M2 | HEART RATE: 70 BPM | OXYGEN SATURATION: 98 % | WEIGHT: 176.8 LBS | HEIGHT: 63 IN | DIASTOLIC BLOOD PRESSURE: 76 MMHG

## 2023-03-21 DIAGNOSIS — R10.12 LEFT UPPER QUADRANT ABDOMINAL PAIN: Primary | ICD-10-CM

## 2023-03-21 DIAGNOSIS — K29.31 CHRONIC SUPERFICIAL GASTRITIS WITH BLEEDING: ICD-10-CM

## 2023-03-21 RX ORDER — PANTOPRAZOLE SODIUM 40 MG/1
40 TABLET, DELAYED RELEASE ORAL DAILY
Qty: 30 TABLET | Refills: 6 | Status: SHIPPED | OUTPATIENT
Start: 2023-03-21

## 2023-03-21 NOTE — PROGRESS NOTES
-  [facility]  Shaheed Garcia 1965 63 y o  female     ASSESSMENT @ PLAN:   She is a 27-year-old female with 6 months of worsening heartburn regurgitation intermittent epigastric abdominal burning and heaviness in her abdomen with a sister who  of gastric cancer  She has never had an ulcer and never had an endoscopy  She had a colonoscopy in 2020 that was normal     1 we will do EGD to investigate    2 I told her to take pantoprazole 40 mg daily for 8 weeks and then we will transition to her to Pepcid as needed  She has been doing Protonix on demand therapy and I told her that this is ineffective  Chief Complaint: Epigastric discomfort and GERD    HPI: She is a 27-year-old female with epigastric abdominal discomfort and GERD  She reports burning epigastric abdominal pain  She reports a heaviness in her abdomen  She reports no fullness she has mild nausea no vomiting  She reports pain in the upper upper left part of the epigastric region sometimes  She has heartburn and regurgitation  She has no solid or liquid food dysphagia  She has no globus she has no belching she has no melena she has no NSAID use  She has never had an endoscopy or an ulcer  Her weight is stable  Her sister  of gastric cancer in   She had a normal colonoscopy at [facility] in 2020  She has been taking her Protonix as needed and has not been working  She does not use any other over-the-counter antacid medications  REVIEW OF SYSTEMS:     CONSTITUTIONAL: Denies any fever, chills, or rigors  Good appetite, and no recent weight loss  HEENT: No earache or tinnitus  Denies hearing loss or visual disturbances  CARDIOVASCULAR: No chest pain or palpitations  RESPIRATORY: Denies any cough, hemoptysis, shortness of breath or dyspnea on exertion  GASTROINTESTINAL: As noted in the History of Present Illness  GENITOURINARY: No problems with urination   Denies any hematuria or dysuria  NEUROLOGIC: No dizziness or vertigo, denies headaches  MUSCULOSKELETAL: Denies any muscle or joint pain  SKIN: Denies skin rashes or itching  ENDOCRINE: Denies excessive thirst  Denies intolerance to heat or cold  PSYCHOSOCIAL: Denies depression or anxiety  Denies any recent memory loss  Past Medical History:   Diagnosis Date   • Hyperlipidemia 2021   • Hypertension 1998      History reviewed  No pertinent surgical history    Social History     Socioeconomic History   • Marital status: /Civil Union     Spouse name: Not on file   • Number of children: Not on file   • Years of education: Not on file   • Highest education level: Not on file   Occupational History   • Not on file   Tobacco Use   • Smoking status: Never   • Smokeless tobacco: Never   Vaping Use   • Vaping Use: Never used   Substance and Sexual Activity   • Alcohol use: Not Currently   • Drug use: Never   • Sexual activity: Yes     Partners: Male     Birth control/protection: None   Other Topics Concern   • Not on file   Social History Narrative   • Not on file     Social Determinants of Health     Financial Resource Strain: Not on file   Food Insecurity: Not on file   Transportation Needs: Not on file   Physical Activity: Not on file   Stress: Not on file   Social Connections: Not on file   Intimate Partner Violence: Not on file   Housing Stability: Not on file     Family History   Problem Relation Age of Onset   • Hypertension Mother    • Lupus Mother    • Seizures Mother    • Glaucoma Mother    • Stomach cancer Sister    • No Known Problems Sister    • No Known Problems Sister    • No Known Problems Daughter    • Alzheimer's disease Maternal Grandmother    • No Known Problems Paternal Grandmother    • No Known Problems Maternal Aunt    • No Known Problems Paternal Aunt    • No Known Problems Paternal Aunt    • No Known Problems Paternal Aunt    • Breast cancer Neg Hx    • Colon cancer Neg Hx    • Ovarian cancer Neg Hx    • Uterine cancer Neg Hx    • Cervical cancer Neg Hx      Patient has no known allergies  Current Outpatient Medications   Medication Sig Dispense Refill   • brimonidine-timolol (COMBIGAN) 0 2-0 5 %      • latanoprost (XALATAN) 0 005 % ophthalmic solution      • losartan-hydrochlorothiazide (HYZAAR) 100-12 5 MG per tablet TAKE 1 TABLET BY MOUTH EVERY DAY 30 tablet 3   • pantoprazole (PROTONIX) 40 mg tablet Take 1 tablet (40 mg total) by mouth daily 30 tablet 6     No current facility-administered medications for this visit  Blood pressure 124/76, pulse 70, height 5' 3" (1 6 m), weight 80 2 kg (176 lb 12 8 oz), SpO2 98 %  PHYSICAL EXAM:     General Appearance:   Alert, cooperative, no distress, appears stated age    HEENT:   Normocephalic, atraumatic, anicteric      Neck:  Supple, symmetrical, trachea midline, no adenopathy;    thyroid: no enlargement/tenderness/nodules; no carotid  bruit or JVD    Lungs:   Clear to auscultation bilaterally; no rales, rhonchi or wheezing; respirations unlabored    Heart[de-identified]   S1 and S2 normal; regular rate and rhythm; no murmur, rub, or gallop     Abdomen:   Soft, non-tender, non-distended; normal bowel sounds; no masses, no organomegaly    Genitalia:   Deferred    Rectal:   Deferred    Extremities:  No cyanosis, clubbing or edema    Pulses:  2+ and symmetric all extremities    Skin:  Skin color, texture, turgor normal, no rashes or lesions    Lymph nodes:  No palpable cervical, axillary or inguinal lymphadenopathy        Lab Results   Component Value Date    WBC 3 23 (L) 05/13/2022    HGB 12 8 05/13/2022    HCT 39 7 05/13/2022    MCV 85 05/13/2022     05/13/2022     Lab Results   Component Value Date    CALCIUM 9 6 05/13/2022    K 3 9 05/13/2022    CO2 30 05/13/2022     05/13/2022    BUN 9 05/13/2022    CREATININE 1 02 05/13/2022     Lab Results   Component Value Date    ALT 30 05/13/2022    AST 25 05/13/2022    ALKPHOS 84 05/13/2022     No results found for: INR, PROTIME

## 2023-03-21 NOTE — PATIENT INSTRUCTIONS
Scheduled date of EGD(as of today):3/30/23  Physician performing EGD:William  Location of EGD:[facility]  Instructions reviewed with patient by:Rhonda MORALES  Clearances:  none

## 2023-03-21 NOTE — H&P (VIEW-ONLY)
-  [facility]  Ida Doran 1965 63 y o  female     ASSESSMENT @ PLAN:   She is a 45-year-old female with 6 months of worsening heartburn regurgitation intermittent epigastric abdominal burning and heaviness in her abdomen with a sister who  of gastric cancer  She has never had an ulcer and never had an endoscopy  She had a colonoscopy in 2020 that was normal     1 we will do EGD to investigate    2 I told her to take pantoprazole 40 mg daily for 8 weeks and then we will transition to her to Pepcid as needed  She has been doing Protonix on demand therapy and I told her that this is ineffective  Chief Complaint: Epigastric discomfort and GERD    HPI: She is a 45-year-old female with epigastric abdominal discomfort and GERD  She reports burning epigastric abdominal pain  She reports a heaviness in her abdomen  She reports no fullness she has mild nausea no vomiting  She reports pain in the upper upper left part of the epigastric region sometimes  She has heartburn and regurgitation  She has no solid or liquid food dysphagia  She has no globus she has no belching she has no melena she has no NSAID use  She has never had an endoscopy or an ulcer  Her weight is stable  Her sister  of gastric cancer in   She had a normal colonoscopy at [facility] in 2020  She has been taking her Protonix as needed and has not been working  She does not use any other over-the-counter antacid medications  REVIEW OF SYSTEMS:     CONSTITUTIONAL: Denies any fever, chills, or rigors  Good appetite, and no recent weight loss  HEENT: No earache or tinnitus  Denies hearing loss or visual disturbances  CARDIOVASCULAR: No chest pain or palpitations  RESPIRATORY: Denies any cough, hemoptysis, shortness of breath or dyspnea on exertion  GASTROINTESTINAL: As noted in the History of Present Illness  GENITOURINARY: No problems with urination   Denies any hematuria or dysuria  NEUROLOGIC: No dizziness or vertigo, denies headaches  MUSCULOSKELETAL: Denies any muscle or joint pain  SKIN: Denies skin rashes or itching  ENDOCRINE: Denies excessive thirst  Denies intolerance to heat or cold  PSYCHOSOCIAL: Denies depression or anxiety  Denies any recent memory loss  Past Medical History:   Diagnosis Date   • Hyperlipidemia 2021   • Hypertension 1998      History reviewed  No pertinent surgical history    Social History     Socioeconomic History   • Marital status: /Civil Union     Spouse name: Not on file   • Number of children: Not on file   • Years of education: Not on file   • Highest education level: Not on file   Occupational History   • Not on file   Tobacco Use   • Smoking status: Never   • Smokeless tobacco: Never   Vaping Use   • Vaping Use: Never used   Substance and Sexual Activity   • Alcohol use: Not Currently   • Drug use: Never   • Sexual activity: Yes     Partners: Male     Birth control/protection: None   Other Topics Concern   • Not on file   Social History Narrative   • Not on file     Social Determinants of Health     Financial Resource Strain: Not on file   Food Insecurity: Not on file   Transportation Needs: Not on file   Physical Activity: Not on file   Stress: Not on file   Social Connections: Not on file   Intimate Partner Violence: Not on file   Housing Stability: Not on file     Family History   Problem Relation Age of Onset   • Hypertension Mother    • Lupus Mother    • Seizures Mother    • Glaucoma Mother    • Stomach cancer Sister    • No Known Problems Sister    • No Known Problems Sister    • No Known Problems Daughter    • Alzheimer's disease Maternal Grandmother    • No Known Problems Paternal Grandmother    • No Known Problems Maternal Aunt    • No Known Problems Paternal Aunt    • No Known Problems Paternal Aunt    • No Known Problems Paternal Aunt    • Breast cancer Neg Hx    • Colon cancer Neg Hx    • Ovarian cancer Neg Hx    • "Uterine cancer Neg Hx    • Cervical cancer Neg Hx      Patient has no known allergies  Current Outpatient Medications   Medication Sig Dispense Refill   • brimonidine-timolol (COMBIGAN) 0 2-0 5 %      • latanoprost (XALATAN) 0 005 % ophthalmic solution      • losartan-hydrochlorothiazide (HYZAAR) 100-12 5 MG per tablet TAKE 1 TABLET BY MOUTH EVERY DAY 30 tablet 3   • pantoprazole (PROTONIX) 40 mg tablet Take 1 tablet (40 mg total) by mouth daily 30 tablet 6     No current facility-administered medications for this visit  Blood pressure 124/76, pulse 70, height 5' 3\" (1 6 m), weight 80 2 kg (176 lb 12 8 oz), SpO2 98 %  PHYSICAL EXAM:     General Appearance:   Alert, cooperative, no distress, appears stated age    HEENT:   Normocephalic, atraumatic, anicteric      Neck:  Supple, symmetrical, trachea midline, no adenopathy;    thyroid: no enlargement/tenderness/nodules; no carotid  bruit or JVD    Lungs:   Clear to auscultation bilaterally; no rales, rhonchi or wheezing; respirations unlabored    Heart[de-identified]   S1 and S2 normal; regular rate and rhythm; no murmur, rub, or gallop     Abdomen:   Soft, non-tender, non-distended; normal bowel sounds; no masses, no organomegaly    Genitalia:   Deferred    Rectal:   Deferred    Extremities:  No cyanosis, clubbing or edema    Pulses:  2+ and symmetric all extremities    Skin:  Skin color, texture, turgor normal, no rashes or lesions    Lymph nodes:  No palpable cervical, axillary or inguinal lymphadenopathy        Lab Results   Component Value Date    WBC 3 23 (L) 05/13/2022    HGB 12 8 05/13/2022    HCT 39 7 05/13/2022    MCV 85 05/13/2022     05/13/2022     Lab Results   Component Value Date    CALCIUM 9 6 05/13/2022    K 3 9 05/13/2022    CO2 30 05/13/2022     05/13/2022    BUN 9 05/13/2022    CREATININE 1 02 05/13/2022     Lab Results   Component Value Date    ALT 30 05/13/2022    AST 25 05/13/2022    ALKPHOS 84 05/13/2022     No results found for: INR, " PROTIME

## 2023-03-30 ENCOUNTER — ANESTHESIA EVENT (OUTPATIENT)
Dept: GASTROENTEROLOGY | Facility: HOSPITAL | Age: 58
End: 2023-03-30

## 2023-03-30 ENCOUNTER — HOSPITAL ENCOUNTER (OUTPATIENT)
Dept: GASTROENTEROLOGY | Facility: HOSPITAL | Age: 58
Setting detail: OUTPATIENT SURGERY
End: 2023-03-30
Attending: INTERNAL MEDICINE

## 2023-03-30 ENCOUNTER — ANESTHESIA (OUTPATIENT)
Dept: GASTROENTEROLOGY | Facility: HOSPITAL | Age: 58
End: 2023-03-30

## 2023-03-30 ENCOUNTER — APPOINTMENT (OUTPATIENT)
Dept: LAB | Facility: HOSPITAL | Age: 58
End: 2023-03-30

## 2023-03-30 VITALS
RESPIRATION RATE: 16 BRPM | HEIGHT: 62 IN | SYSTOLIC BLOOD PRESSURE: 147 MMHG | TEMPERATURE: 97.8 F | DIASTOLIC BLOOD PRESSURE: 79 MMHG | HEART RATE: 64 BPM | BODY MASS INDEX: 31.85 KG/M2 | WEIGHT: 173.06 LBS | OXYGEN SATURATION: 100 %

## 2023-03-30 DIAGNOSIS — R10.12 LEFT UPPER QUADRANT ABDOMINAL PAIN: ICD-10-CM

## 2023-03-30 RX ORDER — SODIUM CHLORIDE, SODIUM LACTATE, POTASSIUM CHLORIDE, CALCIUM CHLORIDE 600; 310; 30; 20 MG/100ML; MG/100ML; MG/100ML; MG/100ML
125 INJECTION, SOLUTION INTRAVENOUS CONTINUOUS
Status: CANCELLED | OUTPATIENT
Start: 2023-03-30

## 2023-03-30 RX ORDER — PROPOFOL 10 MG/ML
INJECTION, EMULSION INTRAVENOUS AS NEEDED
Status: DISCONTINUED | OUTPATIENT
Start: 2023-03-30 | End: 2023-03-30

## 2023-03-30 RX ORDER — LIDOCAINE HYDROCHLORIDE 20 MG/ML
INJECTION, SOLUTION EPIDURAL; INFILTRATION; INTRACAUDAL; PERINEURAL AS NEEDED
Status: DISCONTINUED | OUTPATIENT
Start: 2023-03-30 | End: 2023-03-30

## 2023-03-30 RX ORDER — SODIUM CHLORIDE, SODIUM LACTATE, POTASSIUM CHLORIDE, CALCIUM CHLORIDE 600; 310; 30; 20 MG/100ML; MG/100ML; MG/100ML; MG/100ML
INJECTION, SOLUTION INTRAVENOUS CONTINUOUS PRN
Status: DISCONTINUED | OUTPATIENT
Start: 2023-03-30 | End: 2023-03-30

## 2023-03-30 RX ADMIN — PROPOFOL 50 MG: 10 INJECTION, EMULSION INTRAVENOUS at 11:53

## 2023-03-30 RX ADMIN — PROPOFOL 30 MG: 10 INJECTION, EMULSION INTRAVENOUS at 11:56

## 2023-03-30 RX ADMIN — LIDOCAINE HYDROCHLORIDE 100 MG: 20 INJECTION, SOLUTION EPIDURAL; INFILTRATION; INTRACAUDAL at 11:52

## 2023-03-30 RX ADMIN — PROPOFOL 50 MG: 10 INJECTION, EMULSION INTRAVENOUS at 11:54

## 2023-03-30 RX ADMIN — SODIUM CHLORIDE, SODIUM LACTATE, POTASSIUM CHLORIDE, AND CALCIUM CHLORIDE: .6; .31; .03; .02 INJECTION, SOLUTION INTRAVENOUS at 10:56

## 2023-03-30 RX ADMIN — PROPOFOL 150 MG: 10 INJECTION, EMULSION INTRAVENOUS at 11:52

## 2023-03-30 NOTE — ANESTHESIA PREPROCEDURE EVALUATION
Procedure:  EGD    Relevant Problems   CARDIO   (+) Essential hypertension   (+) Hypercholesteremia      MUSCULOSKELETAL   (+) Chronic bilateral low back pain without sciatica      NEURO/PSYCH   (+) Chronic bilateral low back pain without sciatica   (+) Chronic neck pain        Physical Exam    Airway    Mallampati score: I  TM Distance: >3 FB  Neck ROM: full     Dental   No notable dental hx     Cardiovascular      Pulmonary      Other Findings        Anesthesia Plan  ASA Score- 2     Anesthesia Type- IV sedation with anesthesia with ASA Monitors  Additional Monitors:   Airway Plan:           Plan Factors-Exercise tolerance (METS): >4 METS  Chart reviewed  Patient summary reviewed  Patient is not a current smoker  There is medical exclusion for perioperative obstructive sleep apnea risk education  Induction- intravenous  Postoperative Plan-     Informed Consent- Anesthetic plan and risks discussed with patient  I personally reviewed this patient with the CRNA  Discussed and agreed on the Anesthesia Plan with the CRNA  Denise Tinsley

## 2023-03-30 NOTE — ANESTHESIA POSTPROCEDURE EVALUATION
Post-Op Assessment Note    CV Status:  Stable  Pain Score: 0    Pain management: adequate     Mental Status:  Sleepy   Hydration Status:  Stable   PONV Controlled:  None   Airway Patency:  Patent      Post Op Vitals Reviewed: Yes      Staff: CRNA         No notable events documented      /72 (03/30/23 1207)    Temp 97 8 °F (36 6 °C) (03/30/23 1207)    Pulse 101 (03/30/23 1207)   Resp 18 (03/30/23 1207)    SpO2 96 % (03/30/23 1207)

## 2023-03-30 NOTE — INTERVAL H&P NOTE
H&P reviewed  After examining the patient I find no changes in the patients condition since the H&P had been written      Vitals:    03/30/23 1112   BP: 161/90   Pulse: 93   Resp: 20   Temp: 97 5 °F (36 4 °C)   SpO2: 100%

## 2023-04-01 LAB
ENDOMYSIUM IGA SER QL: POSITIVE
GLIADIN PEPTIDE IGA SER-ACNC: 89 UNITS (ref 0–19)
GLIADIN PEPTIDE IGG SER-ACNC: >150 UNITS (ref 0–19)
IGA SERPL-MCNC: 338 MG/DL (ref 87–352)
TTG IGA SER-ACNC: >100 U/ML (ref 0–3)
TTG IGG SER-ACNC: 7 U/ML (ref 0–5)

## 2023-04-02 DIAGNOSIS — K90.0 CELIAC DISEASE: Primary | ICD-10-CM

## 2023-04-03 ENCOUNTER — TELEPHONE (OUTPATIENT)
Dept: GASTROENTEROLOGY | Facility: CLINIC | Age: 58
End: 2023-04-03

## 2023-04-03 NOTE — TELEPHONE ENCOUNTER
LMOM for patient to phone back and schedule a fup appt with either Tiera Whitaker or Alejandra in 8 weeks

## 2023-04-05 NOTE — RESULT ENCOUNTER NOTE
She has celiac disease  I just left a message on her machine telling her this  I have also sent her other messages about this    She has a message to call us back to have his physician assistant appointment in 8 weeks

## 2023-06-01 ENCOUNTER — OFFICE VISIT (OUTPATIENT)
Dept: GASTROENTEROLOGY | Facility: CLINIC | Age: 58
End: 2023-06-01

## 2023-06-01 VITALS
WEIGHT: 173 LBS | HEART RATE: 87 BPM | BODY MASS INDEX: 31.83 KG/M2 | DIASTOLIC BLOOD PRESSURE: 68 MMHG | OXYGEN SATURATION: 98 % | SYSTOLIC BLOOD PRESSURE: 124 MMHG | HEIGHT: 62 IN

## 2023-06-01 DIAGNOSIS — K90.0 CELIAC DISEASE: Primary | ICD-10-CM

## 2023-06-01 DIAGNOSIS — K21.00 GASTROESOPHAGEAL REFLUX DISEASE WITH ESOPHAGITIS WITHOUT HEMORRHAGE: ICD-10-CM

## 2023-06-01 RX ORDER — PREDNISOLONE ACETATE 10 MG/ML
SUSPENSION/ DROPS OPHTHALMIC
COMMUNITY
Start: 2023-05-16

## 2023-06-01 NOTE — PROGRESS NOTES
[facility] - Outpatient Follow-up Note  Emre Amos 63 y o  female MRN: [id number]  Encounter: [id number]          ASSESSMENT AND PLAN:      1  Celiac disease  - Diagnosed during EGD in March for reported epigastric pain, nausea, and reflux symptoms  - Pathology consistent with celiac disease along with visual scalloping/fissuring of the duodenum and her tTg IgA was significantly positive  - Continue gluten free diet; discussed resources online and she will make an appt with a nutritionist to help her further with adjusting to a gluten free diet  - Plan to recheck tTg IgA in 6 months  - Follow up appt in 6 months    2  GERD  - She does have a 1 cm hiatal hernia and had reflux esophagitis noted during her EGD with improvement in her reflux symptoms using protonix 40 mg daily over the past 2 months  - She would like to try stopping the protonix and just do dietary adjustments  - Discussed anti-reflux diet as well as use of pepcid PRN    Follow up in 6 months     ______________________________________________________________________    DIMITRISLinpatrick Paz is a 64 yo F presenting for follow-up after her endoscopy in March for evaluation of 6 months of worsening reflux symptoms as well as epigastric pain and nausea  She was found to have reflux esophagitis, 1 cm hiatal hernia, and scalloping and fissuring of the duodenum concerning for celiac disease  Pathology was positive for celiac disease and her TTG IgA was positive consistent with celiac disease  She has been working on a gluten-free diet over the past several months and does report improvement in the epigastric pain she was having  She is still having some reflux symptoms at times but is much better with the course of pantoprazole that she has been taking  She notices worsening of her symptoms with things like onions and tomato sauces so she is working on modifying her diet further  She has not seen a nutritionist yet    She "reports that overall she does feel better and she is adjusting to a gluten-free diet  REVIEW OF SYSTEMS IS OTHERWISE NEGATIVE  Historical Information   Past Medical History:   Diagnosis Date   • Hyperlipidemia 2021   • Hypertension 1998     History reviewed  No pertinent surgical history  Social History   Social History     Substance and Sexual Activity   Alcohol Use Not Currently     Social History     Substance and Sexual Activity   Drug Use Never     Social History     Tobacco Use   Smoking Status Never   Smokeless Tobacco Never     Family History   Problem Relation Age of Onset   • Hypertension Mother    • Lupus Mother    • Seizures Mother    • Glaucoma Mother    • Stomach cancer Sister    • No Known Problems Sister    • No Known Problems Sister    • No Known Problems Daughter    • Alzheimer's disease Maternal Grandmother    • No Known Problems Paternal Grandmother    • No Known Problems Maternal Aunt    • No Known Problems Paternal Aunt    • No Known Problems Paternal Aunt    • No Known Problems Paternal Aunt    • Breast cancer Neg Hx    • Colon cancer Neg Hx    • Ovarian cancer Neg Hx    • Uterine cancer Neg Hx    • Cervical cancer Neg Hx        Meds/Allergies       Current Outpatient Medications:   •  brimonidine-timolol (COMBIGAN) 0 2-0 5 %  •  losartan-hydrochlorothiazide (HYZAAR) 100-12 5 MG per tablet  •  prednisoLONE acetate (PRED FORTE) 1 % ophthalmic suspension  •  latanoprost (XALATAN) 0 005 % ophthalmic solution    No Known Allergies        Objective     Blood pressure 124/68, pulse 87, height 5' 2\" (1 575 m), weight 78 5 kg (173 lb), SpO2 98 %  Body mass index is 31 64 kg/m²        PHYSICAL EXAM:      General Appearance:   Alert, cooperative, no distress   HEENT:   Normocephalic, atraumatic, anicteric      Neck:  Supple, symmetrical, trachea midline   Lungs:   Clear to auscultation bilaterally; no rales, rhonchi or wheezing; respirations unlabored    Heart[de-identified]   Regular rate and rhythm; no " murmur, rub, or gallop  Abdomen:   Soft, non-tender, non-distended; normal bowel sounds; no masses, no organomegaly    Genitalia:   Deferred    Rectal:   Deferred    Extremities:  No cyanosis, clubbing or edema    Pulses:  2+ and symmetric    Skin:  No jaundice, rashes, or lesions    Lymph nodes:  No palpable cervical lymphadenopathy        Lab Results:   No visits with results within 1 Day(s) from this visit  Latest known visit with results is:   Hospital Outpatient Visit on 03/30/2023   Component Date Value   • Case Report 03/30/2023                      Value:Surgical Pathology Report                         Case: [id number]                                   Authorizing Provider:  Zaida Kim MD   Collected:           03/30/2023 1154              Ordering Location:      [facility]       Received:            03/30/2023 [address] [place] [address] Endoscopy                                                             Pathologist:           Krystle Salgado MD                                                     Specimens:   A) - Duodenum                                                                                       B) - Stomach                                                                              • Final Diagnosis 03/30/2023                      Value: This result contains rich text formatting which cannot be displayed here  • Additional Information 03/30/2023                      Value: This result contains rich text formatting which cannot be displayed here  • Gross Description 03/30/2023                      Value: This result contains rich text formatting which cannot be displayed here  • Clinical Information 03/30/2023                      Value:Cold bx r/o celiac         Radiology Results:   No results found

## 2023-10-11 ENCOUNTER — TELEPHONE (OUTPATIENT)
Dept: ADMINISTRATIVE | Facility: OTHER | Age: 58
End: 2023-10-11

## 2023-10-11 NOTE — TELEPHONE ENCOUNTER
Upon review of the In Basket request we were able to locate, review, and update the patient chart as requested for Mammogram.    Any additional questions or concerns should be emailed to the Practice Liaisons via the appropriate education email address, please do not reply via In Basket.     Thank you  Betzaida Stokes

## 2023-10-11 NOTE — TELEPHONE ENCOUNTER
----- Message from Maricruz Angulo MA sent at 10/10/2023  2:50 PM EDT -----  Regarding: mammogram  10/10/23 2:50 PM    Hello, our patient Sandra Lovelace has had Mammogram completed/performed. Please assist in updating the patient chart by pulling the Care Everywhere (CE) document.      Thank you,  Maricruz Angulo MA  [facility] Ramos

## 2023-10-11 NOTE — TELEPHONE ENCOUNTER
----- Message from Kristy Larkin MA sent at 10/10/2023  2:50 PM EDT -----  Regarding: mammogram  10/10/23 2:50 PM    Hello, our patient Veronica Allan has had Mammogram completed/performed. Please assist in updating the patient chart by pulling the Care Everywhere (CE) document.      Thank you,  Kristy Larkin MA  [facility]

## 2023-10-11 NOTE — TELEPHONE ENCOUNTER
----- Message from Liss Álvarez MA sent at 10/10/2023  2:50 PM EDT -----  Regarding: mammogram  10/10/23 2:50 PM    Hello, our patient Iam Garcia has had Mammogram completed/performed. Please assist in updating the patient chart by pulling the Care Everywhere (CE) document.      Thank you,  Liss Álvarez MA  [facility] Rena Rebolledo

## 2023-11-14 ENCOUNTER — OFFICE VISIT (OUTPATIENT)
Dept: GASTROENTEROLOGY | Facility: CLINIC | Age: 58
End: 2023-11-14
Payer: COMMERCIAL

## 2023-11-14 VITALS
DIASTOLIC BLOOD PRESSURE: 82 MMHG | BODY MASS INDEX: 31.83 KG/M2 | HEIGHT: 62 IN | HEART RATE: 88 BPM | SYSTOLIC BLOOD PRESSURE: 124 MMHG | WEIGHT: 173 LBS

## 2023-11-14 DIAGNOSIS — K90.0 CELIAC DISEASE: Primary | ICD-10-CM

## 2023-11-14 PROCEDURE — 99213 OFFICE O/P EST LOW 20 MIN: CPT | Performed by: PHYSICIAN ASSISTANT

## 2023-11-14 NOTE — PROGRESS NOTES
[facility] - Outpatient Follow-up Note  Shruti Grover 63 y.o. female MRN: [id number]  Encounter: [id number]          ASSESSMENT AND PLAN:      1. Celiac disease  - Diagnosed in March during EGD; had notable scalloping and scarring in the duodenum with positive pathology and positive tTg IgA consistent with celiac disease  - Symptomatically improving with the gluten free diet as well as an anti-reflux diet as she was having GERD symptoms too  - We will recheck her celiac antibody panel as the tTg IgA should improve and eventually normalize with a gluten free diet  - She will make an appt with a dietician for further help with eating gluten free  - Follow up in 6 months  - Would consider repeat EGD in the next 1-2 years to ensure healing of the duodenum given the scarring and scalloping noted on the initial EGD      ______________________________________________________________________    SUBJECTIVE:  Kamryn Roach is a pleasant 62 yo F presenting for routine follow up of celiac disease diagnosed in March during EGD. This was more so done for reflux symptoms and upper abdominal pain with her sister's history of gastric cancer. She has been on the gluten free diet since that time and is overall feeling better. She reports no further heartburn, though she is also following an anti-reflux diet, and has rare belching. She denies abdominal pain, bloating, or diarrhea. She has not seen a dietician yet for the celiac disease but thinks she will make an appt. REVIEW OF SYSTEMS IS OTHERWISE NEGATIVE. Historical Information   Past Medical History:   Diagnosis Date    Hyperlipidemia 2021    Hypertension 1998     History reviewed. No pertinent surgical history.   Social History   Social History     Substance and Sexual Activity   Alcohol Use Not Currently     Social History     Substance and Sexual Activity   Drug Use Never     Social History     Tobacco Use   Smoking Status Never   Smokeless Tobacco Never     Family History   Problem Relation Age of Onset    Hypertension Mother     Lupus Mother     Seizures Mother     Glaucoma Mother     Stomach cancer Sister     No Known Problems Sister     No Known Problems Sister     No Known Problems Daughter     Alzheimer's disease Maternal Grandmother     No Known Problems Paternal Grandmother     No Known Problems Maternal Aunt     No Known Problems Paternal Aunt     No Known Problems Paternal Aunt     No Known Problems Paternal Aunt     Breast cancer Neg Hx     Colon cancer Neg Hx     Ovarian cancer Neg Hx     Uterine cancer Neg Hx     Cervical cancer Neg Hx        Meds/Allergies       Current Outpatient Medications:     brimonidine-timolol (COMBIGAN) 0.2-0.5 %    latanoprost (XALATAN) 0.005 % ophthalmic solution    losartan-hydrochlorothiazide (HYZAAR) 100-12.5 MG per tablet    prednisoLONE acetate (PRED FORTE) 1 % ophthalmic suspension    No Known Allergies        Objective     Blood pressure 124/82, pulse 88, height 5' 2" (1.575 m), weight 78.5 kg (173 lb). Body mass index is 31.64 kg/m². PHYSICAL EXAM:      General Appearance:   Alert, cooperative, no distress   HEENT:   Normocephalic, atraumatic, anicteric. Neck:  Supple, symmetrical, trachea midline   Lungs:   Clear to auscultation bilaterally; no rales, rhonchi or wheezing; respirations unlabored    Heart[de-identified]   Regular rate and rhythm; no murmur, rub, or gallop. Abdomen:   Soft, non-tender, non-distended; normal bowel sounds; no masses, no organomegaly    Genitalia:   Deferred    Rectal:   Deferred    Extremities:  No cyanosis, clubbing or edema    Pulses:  2+ and symmetric    Skin:  No jaundice, rashes, or lesions    Lymph nodes:  No palpable cervical lymphadenopathy        Lab Results:   No visits with results within 1 Day(s) from this visit.    Latest known visit with results is:   Hospital Outpatient Visit on 03/30/2023   Component Date Value    Case Report 03/30/2023 Value:Surgical Pathology Report                         Case: [id number]                                   Authorizing Provider:  Cliff Sumner MD   Collected:           03/30/2023 1154              Ordering Location:      [facility]       Received:            03/30/2023 [address] Endoscopy                                                             Pathologist:           Mari Trimble MD                                                     Specimens:   A) - Duodenum                                                                                       B) - Stomach                                                                               Final Diagnosis 03/30/2023                      Value: This result contains rich text formatting which cannot be displayed here. Additional Information 03/30/2023                      Value: This result contains rich text formatting which cannot be displayed here. Gross Description 03/30/2023                      Value: This result contains rich text formatting which cannot be displayed here. Clinical Information 03/30/2023                      Value:Cold bx r/o celiac         Radiology Results:   No results found.

## 2023-11-30 ENCOUNTER — APPOINTMENT (OUTPATIENT)
Dept: LAB | Facility: HOSPITAL | Age: 58
End: 2023-11-30
Payer: COMMERCIAL

## 2023-11-30 DIAGNOSIS — K90.0 CELIAC DISEASE: ICD-10-CM

## 2023-11-30 PROCEDURE — 86258 DGP ANTIBODY EACH IG CLASS: CPT

## 2023-11-30 PROCEDURE — 82784 ASSAY IGA/IGD/IGG/IGM EACH: CPT

## 2023-11-30 PROCEDURE — 36415 COLL VENOUS BLD VENIPUNCTURE: CPT

## 2023-11-30 PROCEDURE — 86231 EMA EACH IG CLASS: CPT

## 2023-11-30 PROCEDURE — 86364 TISS TRNSGLTMNASE EA IG CLAS: CPT

## 2023-12-02 LAB
ENDOMYSIUM IGA SER QL: POSITIVE
GLIADIN PEPTIDE IGA SER-ACNC: 23 UNITS (ref 0–19)
GLIADIN PEPTIDE IGG SER-ACNC: 131 UNITS (ref 0–19)
IGA SERPL-MCNC: 305 MG/DL (ref 87–352)
TTG IGA SER-ACNC: 27 U/ML (ref 0–3)
TTG IGG SER-ACNC: 5 U/ML (ref 0–5)

## 2023-12-13 ENCOUNTER — TELEPHONE (OUTPATIENT)
Age: 58
End: 2023-12-13

## 2023-12-13 NOTE — TELEPHONE ENCOUNTER
Patients GI provider:  Dr. Tidwell Query     Number to return call: [phone]    Reason for call: Pt calling requesting for diagnose code for celiac disease for insurance purposes.  Patient can be reached at the above number provided    Scheduled procedure/appointment date if applicable: Apt/procedure

## 2024-05-14 ENCOUNTER — OFFICE VISIT (OUTPATIENT)
Dept: GASTROENTEROLOGY | Facility: CLINIC | Age: 59
End: 2024-05-14
Payer: COMMERCIAL

## 2024-05-14 VITALS
RESPIRATION RATE: 18 BRPM | OXYGEN SATURATION: 97 % | DIASTOLIC BLOOD PRESSURE: 82 MMHG | HEIGHT: 62 IN | HEART RATE: 72 BPM | SYSTOLIC BLOOD PRESSURE: 124 MMHG | BODY MASS INDEX: 31.83 KG/M2 | WEIGHT: 173 LBS | TEMPERATURE: 98 F

## 2024-05-14 DIAGNOSIS — R10.30 LOWER ABDOMINAL PAIN: ICD-10-CM

## 2024-05-14 DIAGNOSIS — K90.0 CELIAC DISEASE: Primary | ICD-10-CM

## 2024-05-14 PROCEDURE — 99213 OFFICE O/P EST LOW 20 MIN: CPT | Performed by: PHYSICIAN ASSISTANT

## 2024-05-14 RX ORDER — NEOMYCIN SULFATE, POLYMYXIN B SULFATE, AND DEXAMETHASONE 3.5; 10000; 1 MG/G; [USP'U]/G; MG/G
OINTMENT OPHTHALMIC
COMMUNITY
Start: 2024-03-21

## 2024-05-14 RX ORDER — MONTELUKAST SODIUM 10 MG/1
10 TABLET ORAL
COMMUNITY
Start: 2024-03-27

## 2024-05-14 NOTE — PROGRESS NOTES
[facility] - Outpatient Follow-up Note  Roma Farias 59 y.o. female MRN: [id number]  Encounter: [id number]          ASSESSMENT AND PLAN:      1. Celiac disease  - Diagnosed in 2023 during EGD with visual scalloping of duodenal folds  - Celiac panel/tTg IgA has improved with going gluten free along with symptoms  - Saw nutrition which really helped her adjust to the gluten free diet  - Continue gluten free diet  - EGD in 2025 to reassess the duodenum    2. Lower abdominal pain  - She reports episodes of lower abdominal cramping which are mild and associated with soft/looser stool, often correlating with times of increased stress/anxiety  - No alarm symptoms  - Colonoscopy in 2020 at [facility], reportedly normal, but was told to have a 5 year recall; we will schedule this for next year unless there are any significant changes/new symptoms    Follow up in 6 months.    ______________________________________________________________________    SUBJECTIVE:  Roma is a 60 yo F presenting for routine follow-up of celiac disease.  She reports that over the past 6 months she is doing much better.  She did see a nutritionist 3 times since I last saw her and she reports that she was very helpful with looking for hidden ingredients that are gluten.  She reports that she generally feels well without any abdominal pain or nausea or bloating on a daily basis.  She did go to [place] for vacation since I last saw her and while there she did have some epigastric pain and so she thinks she had gluten inadvertently but when she got back home and got back to eating her regular diet this quickly resolved.  She reports that she will also have episodes of lower abdominal discomfort and softer looser stool randomly, maybe a couple times a month at most.  The seems to correlate with times of increased stress or anxiety.  There is no black or bloody bowel movements.  Last colonoscopy was in 2020 at [place] and we believe this was a normal exam but she recalls them telling her to come back in 5 years.  We do not think she had any polyps removed but I do not have the report.  She has no family history of colon cancer but her sister did have gastric cancer.      REVIEW OF SYSTEMS IS OTHERWISE NEGATIVE.      Historical Information   Past Medical History:   Diagnosis Date    Hyperlipidemia 2021    Hypertension 1998     History reviewed. No pertinent surgical history.  Social History   Social History     Substance and Sexual Activity   Alcohol Use Not Currently     Social History     Substance and Sexual Activity   Drug Use Never     Social History     Tobacco Use   Smoking Status Never   Smokeless Tobacco Never     Family History   Problem Relation Age of Onset    Hypertension Mother     Lupus Mother     Seizures Mother     Glaucoma Mother     Stomach cancer Sister     No Known Problems Sister     No Known Problems Sister     No Known Problems Daughter     Alzheimer's disease Maternal Grandmother     No Known Problems Paternal Grandmother     No Known Problems Maternal Aunt     No Known Problems Paternal Aunt     No Known Problems Paternal Aunt     No Known Problems Paternal Aunt     Breast cancer Neg Hx     Colon cancer Neg Hx     Ovarian cancer Neg Hx     Uterine cancer Neg Hx     Cervical cancer Neg Hx        Meds/Allergies       Current Outpatient Medications:     brimonidine-timolol (COMBIGAN) 0.2-0.5 %    Diclofenac Sodium (VOLTAREN) 1 %    latanoprost (XALATAN) 0.005 % ophthalmic solution    losartan-hydrochlorothiazide (HYZAAR) 100-12.5 MG per tablet    montelukast (SINGULAIR) 10 mg tablet    neomycin-polymyxin-dexamethasone (MAXITROL) 0.35%-10,000 units/g-0.1%    prednisoLONE acetate (PRED FORTE) 1 % ophthalmic suspension    No Known Allergies        Objective     Blood pressure 124/82, pulse 72, temperature 98 °F (36.7 °C), temperature source Tympanic, resp. rate 18, height 5' 2\" (1.575 m), weight 78.5 " kg (173 lb), SpO2 97%. Body mass index is 31.64 kg/m².      PHYSICAL EXAM:      General Appearance:   Alert, cooperative, no distress   HEENT:   Normocephalic, atraumatic, anicteric.     Neck:  Supple, symmetrical, trachea midline   Lungs:   Clear to auscultation bilaterally; no rales, rhonchi or wheezing; respirations unlabored    Heart::   Regular rate and rhythm; no murmur, rub, or gallop.   Abdomen:   Soft, non-tender, non-distended; normal bowel sounds; no masses, no organomegaly    Genitalia:   Deferred    Rectal:   Deferred    Extremities:  No cyanosis, clubbing or edema    Pulses:  2+ and symmetric    Skin:  No jaundice, rashes, or lesions    Lymph nodes:  No palpable cervical lymphadenopathy        Lab Results:   No visits with results within 1 Day(s) from this visit.   Latest known visit with results is:   Appointment on 11/30/2023   Component Date Value    IgA 11/30/2023 305     Gliadin IgA 11/30/2023 23 (H)     Gliadin IgG 11/30/2023 131 (H)     Tissue Transglut Ab IGG 11/30/2023 5     TISSUE TRANSGLUTAMINASE * 11/30/2023 27 (H)     Endomysial IgA 11/30/2023 Positive (A)          Radiology Results:   No results found.

## 2024-11-13 ENCOUNTER — OFFICE VISIT (OUTPATIENT)
Dept: GASTROENTEROLOGY | Facility: CLINIC | Age: 59
End: 2024-11-13
Payer: COMMERCIAL

## 2024-11-13 VITALS
HEART RATE: 77 BPM | WEIGHT: 177.8 LBS | DIASTOLIC BLOOD PRESSURE: 82 MMHG | RESPIRATION RATE: 18 BRPM | HEIGHT: 62 IN | TEMPERATURE: 98.1 F | SYSTOLIC BLOOD PRESSURE: 124 MMHG | BODY MASS INDEX: 32.72 KG/M2 | OXYGEN SATURATION: 97 %

## 2024-11-13 DIAGNOSIS — R10.31 BILATERAL LOWER ABDOMINAL CRAMPING: ICD-10-CM

## 2024-11-13 DIAGNOSIS — R10.32 BILATERAL LOWER ABDOMINAL CRAMPING: ICD-10-CM

## 2024-11-13 DIAGNOSIS — R10.13 EPIGASTRIC PAIN: Primary | ICD-10-CM

## 2024-11-13 DIAGNOSIS — K21.00 GASTROESOPHAGEAL REFLUX DISEASE WITH ESOPHAGITIS WITHOUT HEMORRHAGE: ICD-10-CM

## 2024-11-13 PROCEDURE — 99214 OFFICE O/P EST MOD 30 MIN: CPT | Performed by: PHYSICIAN ASSISTANT

## 2024-11-13 RX ORDER — METHOCARBAMOL 750 MG/1
750 TABLET, FILM COATED ORAL 3 TIMES DAILY PRN
COMMUNITY
Start: 2024-10-10

## 2024-11-13 RX ORDER — PANTOPRAZOLE SODIUM 40 MG/1
40 TABLET, DELAYED RELEASE ORAL DAILY
Qty: 30 TABLET | Refills: 1 | Status: SHIPPED | OUTPATIENT
Start: 2024-11-13 | End: 2024-11-15 | Stop reason: SDUPTHER

## 2024-11-13 NOTE — PROGRESS NOTES
[facility] - Outpatient Follow-up Note  Roma Farias 59 y.o. female MRN: [id number]  Encounter: [id number]          ASSESSMENT AND PLAN:      1. Epigastric pain  2. Gastroesophageal reflux disease with esophagitis without hemorrhage  - She has a history of celiac disease diagnosed in 2023 and GERD with esophagitis, presenting with epigastric burning and belching since September which is likely reflux related  - We will plan for a protonix 40 mg daily course for 6-8 weeks  - EGD if symptoms worsen, regardless would plan for EGD in 2025 to rebiopsy duodenum    3. Bilateral lower abdominal cramping  - Intermittent cramping since October without a prior IBS diagnosis and no associated change in bowel habits or blood in the stool  - Due for colonoscopy in 2025, we discussed doing this sooner to evaluate further but she would like to hold off and monitor symptoms, advised to call if symptoms worsen and we will schedule colonoscopy    Follow up in 8 weeks.    ______________________________________________________________________    SUBJECTIVE:  Roma is a 58 yo F presenting for routine follow up.  She reports that since September she had been having epigastric burning with belching.  In October she also had this intense episode of lower abdominal cramping that was relieved when she had a bowel movement and that she has had some intermittent milder cramping since that time occasionally.  She never had anything like that before or been diagnosed with IBS before.  She denies any change in her bowel habits and is still having soft easy bowel movements without constipation or diarrhea.  There is no blood in her stool.  She is on a gluten-free diet and is very cautious with what she eats.      REVIEW OF SYSTEMS IS OTHERWISE NEGATIVE.      Historical Information   Past Medical History:   Diagnosis Date    Celiac disease     GERD (gastroesophageal reflux disease)     Hyperlipidemia 2021     "Hypertension 1998     Past Surgical History:   Procedure Laterality Date    UPPER GASTROINTESTINAL ENDOSCOPY       Social History   Social History     Substance and Sexual Activity   Alcohol Use Never     Social History     Substance and Sexual Activity   Drug Use Never     Social History     Tobacco Use   Smoking Status Never   Smokeless Tobacco Never     Family History   Problem Relation Age of Onset    Hypertension Mother     Lupus Mother     Seizures Mother     Glaucoma Mother     Stomach cancer Sister     No Known Problems Sister     No Known Problems Sister     No Known Problems Daughter     Alzheimer's disease Maternal Grandmother     No Known Problems Paternal Grandmother     No Known Problems Maternal Aunt     No Known Problems Paternal Aunt     No Known Problems Paternal Aunt     No Known Problems Paternal Aunt     Breast cancer Neg Hx     Colon cancer Neg Hx     Ovarian cancer Neg Hx     Uterine cancer Neg Hx     Cervical cancer Neg Hx        Meds/Allergies       Current Outpatient Medications:     Diclofenac Sodium (VOLTAREN) 1 %    latanoprost (XALATAN) 0.005 % ophthalmic solution    losartan-hydrochlorothiazide (HYZAAR) 100-12.5 MG per tablet    methocarbamol (ROBAXIN) 750 mg tablet    montelukast (SINGULAIR) 10 mg tablet    pantoprazole (PROTONIX) 40 mg tablet    brimonidine-timolol (COMBIGAN) 0.2-0.5 %    neomycin-polymyxin-dexamethasone (MAXITROL) 0.35%-10,000 units/g-0.1%    prednisoLONE acetate (PRED FORTE) 1 % ophthalmic suspension    No Known Allergies        Objective     Blood pressure 124/82, pulse 77, temperature 98.1 °F (36.7 °C), temperature source Tympanic, resp. rate 18, height 5' 2\" (1.575 m), weight 80.6 kg (177 lb 12.8 oz), SpO2 97%. Body mass index is 32.52 kg/m².      PHYSICAL EXAM:      General Appearance:   Alert, cooperative, no distress   HEENT:   Normocephalic, atraumatic, anicteric.     Neck:  Supple, symmetrical, trachea midline   Lungs:   Clear to auscultation bilaterally; " no rales, rhonchi or wheezing; respirations unlabored    Heart::   Regular rate and rhythm; no murmur, rub, or gallop.   Abdomen:   Soft, non-tender, non-distended; normal bowel sounds; no masses, no organomegaly    Genitalia:   Deferred    Rectal:   Deferred    Extremities:  No cyanosis, clubbing or edema    Pulses:  2+ and symmetric    Skin:  No jaundice, rashes, or lesions    Lymph nodes:  No palpable cervical lymphadenopathy        Lab Results:   No visits with results within 1 Day(s) from this visit.   Latest known visit with results is:   Appointment on 11/30/2023   Component Date Value    IgA 11/30/2023 305     Gliadin IgA 11/30/2023 23 (H)     Gliadin IgG 11/30/2023 131 (H)     Tissue Transglut Ab IGG 11/30/2023 5     TISSUE TRANSGLUTAMINASE * 11/30/2023 27 (H)     Endomysial IgA 11/30/2023 Positive (A)          Radiology Results:   No results found.

## 2024-11-15 ENCOUNTER — TELEPHONE (OUTPATIENT)
Age: 59
End: 2024-11-15

## 2024-11-15 ENCOUNTER — DOCUMENTATION (OUTPATIENT)
Dept: GASTROENTEROLOGY | Facility: CLINIC | Age: 59
End: 2024-11-15

## 2024-11-15 DIAGNOSIS — K21.00 GASTROESOPHAGEAL REFLUX DISEASE WITH ESOPHAGITIS WITHOUT HEMORRHAGE: ICD-10-CM

## 2024-11-15 DIAGNOSIS — R10.13 EPIGASTRIC PAIN: Primary | ICD-10-CM

## 2024-11-15 RX ORDER — PANTOPRAZOLE SODIUM 40 MG/1
40 TABLET, DELAYED RELEASE ORAL DAILY
Qty: 90 TABLET | Refills: 1 | Status: SHIPPED | OUTPATIENT
Start: 2024-11-15

## 2024-11-15 NOTE — TELEPHONE ENCOUNTER
Patients GI provider:  PHILIPPE Carrero    Number to return call: [phone]    Reason for call: Pt calling to request that script for pantoprazole be transferred to [facility], per insurance needs to be sent from there. Please transfer script over.    Scheduled procedure/appointment date if applicable: Apt 1/06/2025

## 2024-11-20 ENCOUNTER — TELEPHONE (OUTPATIENT)
Age: 59
End: 2024-11-20

## 2024-11-20 NOTE — TELEPHONE ENCOUNTER
Patients GI provider:  PA: Alejandra Carrero     Number to return call: [phone]    Reason for call: Pt calling requesting to speak with someone regarding questions about how long she should be taking pantoprazole. Pt  can be reached at the above number provided. Thank you    Scheduled procedure/appointment date if applicable: Apt/procedure

## 2024-11-20 NOTE — TELEPHONE ENCOUNTER
Called and spoke to patient. Gave patient message from [facility] regarding medication. Patient voiced understanding and had no further questions or concerns

## 2024-11-20 NOTE — TELEPHONE ENCOUNTER
Routing to PA    Patient Is asking how long she should be taking pantoprazole.     Please advise. Thank you !

## 2025-01-16 ENCOUNTER — OFFICE VISIT (OUTPATIENT)
Dept: GASTROENTEROLOGY | Facility: CLINIC | Age: 60
End: 2025-01-16
Payer: COMMERCIAL

## 2025-01-16 VITALS
RESPIRATION RATE: 18 BRPM | BODY MASS INDEX: 31.47 KG/M2 | HEART RATE: 87 BPM | DIASTOLIC BLOOD PRESSURE: 82 MMHG | HEIGHT: 62 IN | TEMPERATURE: 98.5 F | WEIGHT: 171 LBS | SYSTOLIC BLOOD PRESSURE: 122 MMHG | OXYGEN SATURATION: 97 %

## 2025-01-16 DIAGNOSIS — K90.0 CELIAC DISEASE: ICD-10-CM

## 2025-01-16 DIAGNOSIS — Z12.11 SCREENING FOR COLON CANCER: ICD-10-CM

## 2025-01-16 DIAGNOSIS — K21.00 GASTROESOPHAGEAL REFLUX DISEASE WITH ESOPHAGITIS WITHOUT HEMORRHAGE: ICD-10-CM

## 2025-01-16 DIAGNOSIS — R10.84 GENERALIZED ABDOMINAL PAIN: ICD-10-CM

## 2025-01-16 DIAGNOSIS — R11.14 BILIOUS VOMITING WITH NAUSEA: Primary | ICD-10-CM

## 2025-01-16 PROCEDURE — 99213 OFFICE O/P EST LOW 20 MIN: CPT | Performed by: PHYSICIAN ASSISTANT

## 2025-01-16 NOTE — PROGRESS NOTES
Name: Roma Farias      : 1965      MRN: [id number]  Encounter Provider: Alejandra Carrero PA-C  Encounter Date: 2025   Encounter department: [facility] [place]  :  Assessment & Plan  Bilious vomiting with nausea  - Suspect she had an infectious enteritis earlier this week leading to the acute abdominal pain, N/V, and frequent soft BMs which has now resolved  - Resume PO intake; BRAT diet, bland diet, advance as tolerated       Generalized abdominal pain         Screening for colon cancer  - Due for colonoscopy this year; she will let me know if she is doing well in a few weeks and then we will go ahead and schedule this       Celiac disease  - Gluten free diet; EGD planned for this year to reassess duodenum with biopsies       Gastroesophageal reflux disease with esophagitis without hemorrhage  - Had recent worsening of symptoms with epigastric burning and pain that has resolved with pantoprazole 40 mg daily; she will discontinue this as she completed a 2 month course  - Repeat EGD this year primarily to assess her celiac disease with biopsies of the duodenum; we will schedule this if she is doing well in a few weeks after her acute enteritis symptoms           History of Present Illness   HPI  Roma Farias is a 60 y.o. female who presents for routine follow-up after she was seen in November with epigastric burning she reports this resolved with the pantoprazole course.  On Tuesday she developed acute abdominal pain with frequent bowel movements that were very soft and some nausea with an episode of vomiting and now she feels fine but she is afraid to eat.  She is not sure why this happened.  She does juice but drinks juice that she normally would drink and her daughter did make her a pesto dish but she does not have any allergies that she knows of.  Nobody else is sick that she has come in contact with and her family is fine.  She denies eating anything  "undercooked.  She is due for an EGD and a colonoscopy this year.  History obtained from: patient    Review of Systems  Medical History Reviewed by provider this encounter:  Tobacco  Allergies  Meds  Problems  Med Hx  Surg Hx  Fam Hx     .  Current Outpatient Medications on File Prior to Visit   Medication Sig Dispense Refill    Diclofenac Sodium (VOLTAREN) 1 % APPLY 2G TOPICALLY TO AFFECTED AREA 4 TIMES A DAY FOR 20 DAYS.      latanoprost (XALATAN) 0.005 % ophthalmic solution       losartan-hydrochlorothiazide (HYZAAR) 100-12.5 MG per tablet TAKE 1 TABLET BY MOUTH EVERY DAY 30 tablet 3    methocarbamol (ROBAXIN) 750 mg tablet Take 750 mg by mouth Three times daily as needed      montelukast (SINGULAIR) 10 mg tablet Take 10 mg by mouth      pantoprazole (PROTONIX) 40 mg tablet Take 1 tablet (40 mg total) by mouth daily 90 tablet 1    brimonidine-timolol (COMBIGAN) 0.2-0.5 %  (Patient not taking: Reported on 11/13/2024)      neomycin-polymyxin-dexamethasone (MAXITROL) 0.35%-10,000 units/g-0.1% APPLY A SMALL AMOUNT INTO RIGHT EYE FOUR TIMES A DAY AFTER SURGERY (Patient not taking: Reported on 1/16/2025)      prednisoLONE acetate (PRED FORTE) 1 % ophthalmic suspension INSTILL 1 DROP INTO RIGHT EYE THREE TIMES A DAY AS DIRECTED FOR USE AFTER LASER (Patient not taking: Reported on 5/14/2024)       No current facility-administered medications on file prior to visit.         Objective   /82 (BP Location: Left arm, Patient Position: Sitting, Cuff Size: Standard)   Pulse 87   Temp 98.5 °F (36.9 °C) (Tympanic)   Resp 18   Ht 5' 2\" (1.575 m)   Wt 77.6 kg (171 lb)   SpO2 97%   BMI 31.28 kg/m²      Physical Exam  General- no acute distress, appears well  CV- RRR, no murmur  Abdomen- Nondistended, soft, BS active, NTTP  "

## 2025-02-06 ENCOUNTER — TELEPHONE (OUTPATIENT)
Age: 60
End: 2025-02-06

## 2025-02-06 NOTE — TELEPHONE ENCOUNTER
Patients GI provider:  MAURICE Garner     Number to return call: [phone]    Reason for call: Patient called in asking to send a message on her behalf. Patient states she is feeling much better and would like orders placed for colonoscopy and EGD. Please reach out to patient, thank you.    Scheduled procedure/appointment date if applicable: Apt/procedure n/a

## 2025-02-07 NOTE — TELEPHONE ENCOUNTER
I put the orders in and we discussed this at her OV in January but she was getting over a stomach virus so wanted to wait to schedule. Can you get her scheduled with Dr. Thomas? Miralax prep.

## 2025-02-07 NOTE — TELEPHONE ENCOUNTER
Spoke with the patient and she scheduled her procedures. Prep instructions sent via [employer].    Scheduled date of EGD/colonoscopy (as of today):3/7/25  Physician performing EGD/colonoscopy:William  Location of EGD/colonoscopy:[facility] bowel prep reviewed with patient:miralax/dulcolax  Instructions reviewed with patient by:Rhonda MORALES  Clearances:   none

## 2025-03-06 ENCOUNTER — TELEPHONE (OUTPATIENT)
Dept: GASTROENTEROLOGY | Facility: HOSPITAL | Age: 60
End: 2025-03-06

## 2025-03-06 NOTE — TELEPHONE ENCOUNTER
Pt calling asking for arrival time. I informed pt that she is to arrive at her appt tomorrow at 9:15 am and not earlier. I verified campus and address she is going to and informed her she can check in at the kiosk there. Pt asked where should she go and informed her that once she checks in someone will call her and she can also speak to anyone at . Pt understood.

## 2025-03-07 ENCOUNTER — HOSPITAL ENCOUNTER (OUTPATIENT)
Dept: GASTROENTEROLOGY | Facility: HOSPITAL | Age: 60
Setting detail: OUTPATIENT SURGERY
End: 2025-03-07
Payer: COMMERCIAL

## 2025-03-07 ENCOUNTER — ANESTHESIA EVENT (OUTPATIENT)
Dept: GASTROENTEROLOGY | Facility: HOSPITAL | Age: 60
End: 2025-03-07
Payer: COMMERCIAL

## 2025-03-07 ENCOUNTER — ANESTHESIA (OUTPATIENT)
Dept: GASTROENTEROLOGY | Facility: HOSPITAL | Age: 60
End: 2025-03-07
Payer: COMMERCIAL

## 2025-03-07 VITALS
HEART RATE: 61 BPM | SYSTOLIC BLOOD PRESSURE: 106 MMHG | BODY MASS INDEX: 31.16 KG/M2 | DIASTOLIC BLOOD PRESSURE: 68 MMHG | OXYGEN SATURATION: 100 % | RESPIRATION RATE: 23 BRPM | TEMPERATURE: 97.5 F | WEIGHT: 169.31 LBS | HEIGHT: 62 IN

## 2025-03-07 DIAGNOSIS — Z12.11 SCREENING FOR COLON CANCER: ICD-10-CM

## 2025-03-07 DIAGNOSIS — K21.00 GASTROESOPHAGEAL REFLUX DISEASE WITH ESOPHAGITIS WITHOUT HEMORRHAGE: ICD-10-CM

## 2025-03-07 DIAGNOSIS — R10.13 EPIGASTRIC PAIN: ICD-10-CM

## 2025-03-07 DIAGNOSIS — K90.0 CELIAC DISEASE: ICD-10-CM

## 2025-03-07 PROCEDURE — 43239 EGD BIOPSY SINGLE/MULTIPLE: CPT | Performed by: INTERNAL MEDICINE

## 2025-03-07 PROCEDURE — 88342 IMHCHEM/IMCYTCHM 1ST ANTB: CPT | Performed by: PATHOLOGY

## 2025-03-07 PROCEDURE — 88341 IMHCHEM/IMCYTCHM EA ADD ANTB: CPT | Performed by: PATHOLOGY

## 2025-03-07 PROCEDURE — 88305 TISSUE EXAM BY PATHOLOGIST: CPT | Performed by: PATHOLOGY

## 2025-03-07 PROCEDURE — 45385 COLONOSCOPY W/LESION REMOVAL: CPT | Performed by: INTERNAL MEDICINE

## 2025-03-07 RX ORDER — PROPOFOL 10 MG/ML
INJECTION, EMULSION INTRAVENOUS AS NEEDED
Status: DISCONTINUED | OUTPATIENT
Start: 2025-03-07 | End: 2025-03-07

## 2025-03-07 RX ORDER — SODIUM CHLORIDE, SODIUM LACTATE, POTASSIUM CHLORIDE, CALCIUM CHLORIDE 600; 310; 30; 20 MG/100ML; MG/100ML; MG/100ML; MG/100ML
75 INJECTION, SOLUTION INTRAVENOUS CONTINUOUS
Status: DISCONTINUED | OUTPATIENT
Start: 2025-03-07 | End: 2025-03-11 | Stop reason: HOSPADM

## 2025-03-07 RX ORDER — SODIUM CHLORIDE, SODIUM LACTATE, POTASSIUM CHLORIDE, CALCIUM CHLORIDE 600; 310; 30; 20 MG/100ML; MG/100ML; MG/100ML; MG/100ML
INJECTION, SOLUTION INTRAVENOUS CONTINUOUS PRN
Status: DISCONTINUED | OUTPATIENT
Start: 2025-03-07 | End: 2025-03-07

## 2025-03-07 RX ORDER — LIDOCAINE HYDROCHLORIDE 20 MG/ML
INJECTION, SOLUTION EPIDURAL; INFILTRATION; INTRACAUDAL; PERINEURAL AS NEEDED
Status: DISCONTINUED | OUTPATIENT
Start: 2025-03-07 | End: 2025-03-07

## 2025-03-07 RX ORDER — PANTOPRAZOLE SODIUM 40 MG/1
40 TABLET, DELAYED RELEASE ORAL DAILY
Qty: 90 TABLET | Refills: 1 | Status: SHIPPED | OUTPATIENT
Start: 2025-03-07 | End: 2025-03-07 | Stop reason: SDUPTHER

## 2025-03-07 RX ORDER — PANTOPRAZOLE SODIUM 40 MG/1
40 TABLET, DELAYED RELEASE ORAL DAILY
Qty: 90 TABLET | Refills: 1 | Status: SHIPPED | OUTPATIENT
Start: 2025-03-07

## 2025-03-07 RX ADMIN — PROPOFOL 100 MG: 10 INJECTION, EMULSION INTRAVENOUS at 10:36

## 2025-03-07 RX ADMIN — LIDOCAINE HYDROCHLORIDE 50 MG: 20 INJECTION, SOLUTION EPIDURAL; INFILTRATION; INTRACAUDAL; PERINEURAL at 10:21

## 2025-03-07 RX ADMIN — SODIUM CHLORIDE, SODIUM LACTATE, POTASSIUM CHLORIDE, AND CALCIUM CHLORIDE 75 ML/HR: .6; .31; .03; .02 INJECTION, SOLUTION INTRAVENOUS at 09:37

## 2025-03-07 RX ADMIN — PROPOFOL 100 MG: 10 INJECTION, EMULSION INTRAVENOUS at 10:28

## 2025-03-07 RX ADMIN — PROPOFOL 150 MG: 10 INJECTION, EMULSION INTRAVENOUS at 10:21

## 2025-03-07 RX ADMIN — PROPOFOL 50 MG: 10 INJECTION, EMULSION INTRAVENOUS at 10:23

## 2025-03-07 RX ADMIN — SODIUM CHLORIDE, SODIUM LACTATE, POTASSIUM CHLORIDE, AND CALCIUM CHLORIDE: .6; .31; .03; .02 INJECTION, SOLUTION INTRAVENOUS at 10:17

## 2025-03-07 NOTE — TELEPHONE ENCOUNTER
Reason for call: Not a duplicate- Change of pharmacy   [x] Refill   [] Prior Auth  [] Other:     Office:   [] PCP/Provider -   [x] Specialty/Provider -     Medication: pantoprazole     Dose/Frequency: 40 mg daily     Quantity: 90D     Pharmacy: [facility]   Does the patient have enough for 3 days?   [] Yes   [x] No - Send as HP to POD    Mail Away Pharmacy   Does the patient have enough for 10 days?   [] Yes   [] No - Send as HP to POD

## 2025-03-07 NOTE — ANESTHESIA PREPROCEDURE EVALUATION
Procedure:  COLONOSCOPY  EGD    Relevant Problems   CARDIO   (+) Essential hypertension   (+) Hypercholesteremia      MUSCULOSKELETAL   (+) Chronic bilateral low back pain without sciatica      NEURO/PSYCH   (+) Chronic bilateral low back pain without sciatica   (+) Chronic neck pain      Left Ventricle   Systolic function is normal with an ejection fraction of 60-65%. Wall motion is within normal limits.     Mitral Valve   Mitral valve structure is normal. There is no regurgitation or stenosis.     Tricuspid Valve   Tricuspid valve structure is normal. There is trace regurgitation. There is no evidence of tricuspid valve stenosis.     Aortic Valve   The aortic valve is trileaflet. There is no regurgitation or stenosis.     Pulmonic Valve   The pulmonic valve was not well visualized.         Physical Exam    Airway    Mallampati score: II  TM Distance: >3 FB  Neck ROM: full     Dental       Cardiovascular  Cardiovascular exam normal    Pulmonary  Pulmonary exam normal     Other Findings  post-pubertal.      Anesthesia Plan  ASA Score- 2     Anesthesia Type- IV sedation with anesthesia with ASA Monitors.         Additional Monitors:     Airway Plan:            Plan Factors-Exercise tolerance (METS): >4 METS.    Chart reviewed. EKG reviewed. Imaging results reviewed. Existing labs reviewed. Patient summary reviewed.                  Induction- intravenous.    Postoperative Plan-         Informed Consent- Anesthetic plan and risks discussed with patient.  I personally reviewed this patient with the CRNA. Discussed and agreed on the Anesthesia Plan with the CRNA..      NPO Status:  Vitals Value Taken Time   Date of last liquid 03/07/25 03/07/25 0930   Time of last liquid 0700 03/07/25 0930   Date of last solid 03/05/25 03/07/25 0930   Time of last solid 1900 03/07/25 0930

## 2025-03-07 NOTE — H&P
"History and Physical -  [facility]  Roma Farias 60 y.o. female MRN: [id number]                  HPI: Roma Farias is a 60 y.o. year old female who presents for endoscopy colonoscopy for GERD celiac and colon polyps      REVIEW OF SYSTEMS: Per the HPI, and otherwise unremarkable.    Historical Information   Past Medical History:   Diagnosis Date    Celiac disease     Colon polyp     GERD (gastroesophageal reflux disease)     Hyperlipidemia 2021    Hypertension 1998     Past Surgical History:   Procedure Laterality Date    COLONOSCOPY      UPPER GASTROINTESTINAL ENDOSCOPY       Social History   Social History     Substance and Sexual Activity   Alcohol Use Never     Social History     Substance and Sexual Activity   Drug Use Never     Social History     Tobacco Use   Smoking Status Never   Smokeless Tobacco Never     Family History   Problem Relation Age of Onset    Hypertension Mother     Lupus Mother     Seizures Mother     Glaucoma Mother     Stomach cancer Sister     No Known Problems Sister     No Known Problems Sister     No Known Problems Daughter     Alzheimer's disease Maternal Grandmother     No Known Problems Paternal Grandmother     No Known Problems Maternal Aunt     No Known Problems Paternal Aunt     No Known Problems Paternal Aunt     No Known Problems Paternal Aunt     Breast cancer Neg Hx     Colon cancer Neg Hx     Ovarian cancer Neg Hx     Uterine cancer Neg Hx     Cervical cancer Neg Hx        Meds/Allergies     Not in a hospital admission.    No Known Allergies    Objective     Blood pressure 140/85, pulse 97, temperature 97.5 °F (36.4 °C), temperature source Temporal, resp. rate 20, height 5' 2\" (1.575 m), weight 76.8 kg (169 lb 5 oz), SpO2 99%.      PHYSICAL EXAM    /85   Pulse 97   Temp 97.5 °F (36.4 °C) (Temporal)   Resp 20   Ht 5' 2\" (1.575 m)   Wt 76.8 kg (169 lb 5 oz)   SpO2 99%   BMI 30.97 kg/m²       Gen: NAD  CV: RRR  CHEST: Clear  ABD: soft, " NT/ND  EXT: no edema      ASSESSMENT/PLAN:  This is a 60 y.o. year old female here for diascopy colonoscopy, and she is stable and optimized for her procedure.

## 2025-03-07 NOTE — ANESTHESIA POSTPROCEDURE EVALUATION
Post-Op Assessment Note    CV Status:  Stable  Pain Score: 0    Pain management: adequate       Mental Status:  Sleepy   Hydration Status:  Stable   PONV Controlled:  None   Airway Patency:  Patent     Post Op Vitals Reviewed: Yes    No anethesia notable event occurred.    Staff: CRNA           Last Filed PACU Vitals:  Vitals Value Taken Time   Temp 97.5 °F (36.4 °C) 03/07/25 1045   Pulse 82 03/07/25 1045   /74 03/07/25 1045   Resp 22 03/07/25 1045   SpO2 99 % 03/07/25 1045       Modified Lynda:     Vitals Value Taken Time   Activity 2 03/07/25 1045   Respiration 2 03/07/25 1045   Circulation 2 03/07/25 1045   Consciousness 1 03/07/25 1045   Oxygen Saturation 2 03/07/25 1045     Modified Lynda Score: 9

## 2025-03-12 ENCOUNTER — RESULTS FOLLOW-UP (OUTPATIENT)
Dept: GASTROENTEROLOGY | Facility: CLINIC | Age: 60
End: 2025-03-12

## 2025-04-07 ENCOUNTER — APPOINTMENT (EMERGENCY)
Dept: CT IMAGING | Facility: HOSPITAL | Age: 60
End: 2025-04-07
Payer: COMMERCIAL

## 2025-04-07 ENCOUNTER — HOSPITAL ENCOUNTER (OUTPATIENT)
Facility: HOSPITAL | Age: 60
Setting detail: OBSERVATION
Discharge: HOME | End: 2025-04-08
Attending: EMERGENCY MEDICINE | Admitting: STUDENT IN AN ORGANIZED HEALTH CARE EDUCATION/TRAINING PROGRAM
Payer: COMMERCIAL

## 2025-04-07 DIAGNOSIS — R29.90 STROKE-LIKE SYMPTOMS: ICD-10-CM

## 2025-04-07 DIAGNOSIS — R42 DYSEQUILIBRIUM: Primary | ICD-10-CM

## 2025-04-07 LAB
ALBUMIN SERPL BCG-MCNC: 4.1 G/DL (ref 3.5–5)
ALP SERPL-CCNC: 82 U/L (ref 34–104)
ALT SERPL W P-5'-P-CCNC: 18 U/L (ref 7–52)
ANION GAP SERPL CALCULATED.3IONS-SCNC: 8 MMOL/L (ref 4–13)
APTT PPP: 24 SECONDS (ref 23–34)
AST SERPL W P-5'-P-CCNC: 21 U/L (ref 13–39)
BASOPHILS # BLD AUTO: 0.02 THOUSANDS/ÂΜL (ref 0–0.1)
BASOPHILS NFR BLD AUTO: 0 % (ref 0–1)
BILIRUB SERPL-MCNC: 0.5 MG/DL (ref 0.2–1)
BUN SERPL-MCNC: 10 MG/DL (ref 5–25)
CALCIUM SERPL-MCNC: 9.2 MG/DL (ref 8.4–10.2)
CARDIAC TROPONIN I PNL SERPL HS: 4 NG/L (ref ?–50)
CHLORIDE SERPL-SCNC: 103 MMOL/L (ref 96–108)
CO2 SERPL-SCNC: 28 MMOL/L (ref 21–32)
CREAT SERPL-MCNC: 1 MG/DL (ref 0.6–1.3)
EOSINOPHIL # BLD AUTO: 0.06 THOUSAND/ÂΜL (ref 0–0.61)
EOSINOPHIL NFR BLD AUTO: 1 % (ref 0–6)
ERYTHROCYTE [DISTWIDTH] IN BLOOD BY AUTOMATED COUNT: 13.8 % (ref 11.6–15.1)
GFR SERPL CREATININE-BSD FRML MDRD: 61 ML/MIN/1.73SQ M
GLUCOSE SERPL-MCNC: 109 MG/DL (ref 65–140)
GLUCOSE SERPL-MCNC: 129 MG/DL (ref 65–140)
HCT VFR BLD AUTO: 36 % (ref 34.8–46.1)
HGB BLD-MCNC: 11.9 G/DL (ref 11.5–15.4)
IMM GRANULOCYTES # BLD AUTO: 0.01 THOUSAND/UL (ref 0–0.2)
IMM GRANULOCYTES NFR BLD AUTO: 0 % (ref 0–2)
INR PPP: 0.86 (ref 0.85–1.19)
LYMPHOCYTES # BLD AUTO: 2.32 THOUSANDS/ÂΜL (ref 0.6–4.47)
LYMPHOCYTES NFR BLD AUTO: 41 % (ref 14–44)
MCH RBC QN AUTO: 27.5 PG (ref 26.8–34.3)
MCHC RBC AUTO-ENTMCNC: 33.1 G/DL (ref 31.4–37.4)
MCV RBC AUTO: 83 FL (ref 82–98)
MONOCYTES # BLD AUTO: 0.3 THOUSAND/ÂΜL (ref 0.17–1.22)
MONOCYTES NFR BLD AUTO: 5 % (ref 4–12)
NEUTROPHILS # BLD AUTO: 3.01 THOUSANDS/ÂΜL (ref 1.85–7.62)
NEUTS SEG NFR BLD AUTO: 53 % (ref 43–75)
NRBC BLD AUTO-RTO: 0 /100 WBCS
PLATELET # BLD AUTO: 327 THOUSANDS/UL (ref 149–390)
PMV BLD AUTO: 10.8 FL (ref 8.9–12.7)
POTASSIUM SERPL-SCNC: 3.4 MMOL/L (ref 3.5–5.3)
PROT SERPL-MCNC: 7.5 G/DL (ref 6.4–8.4)
PROTHROMBIN TIME: 12.4 SECONDS (ref 12.3–15)
RBC # BLD AUTO: 4.33 MILLION/UL (ref 3.81–5.12)
SODIUM SERPL-SCNC: 139 MMOL/L (ref 135–147)
WBC # BLD AUTO: 5.72 THOUSAND/UL (ref 4.31–10.16)

## 2025-04-07 PROCEDURE — 99285 EMERGENCY DEPT VISIT HI MDM: CPT

## 2025-04-07 PROCEDURE — 99285 EMERGENCY DEPT VISIT HI MDM: CPT | Performed by: EMERGENCY MEDICINE

## 2025-04-07 PROCEDURE — 99223 1ST HOSP IP/OBS HIGH 75: CPT

## 2025-04-07 PROCEDURE — 82948 REAGENT STRIP/BLOOD GLUCOSE: CPT

## 2025-04-07 PROCEDURE — 85730 THROMBOPLASTIN TIME PARTIAL: CPT

## 2025-04-07 PROCEDURE — 80053 COMPREHEN METABOLIC PANEL: CPT

## 2025-04-07 PROCEDURE — 93005 ELECTROCARDIOGRAM TRACING: CPT

## 2025-04-07 PROCEDURE — 70498 CT ANGIOGRAPHY NECK: CPT

## 2025-04-07 PROCEDURE — 70496 CT ANGIOGRAPHY HEAD: CPT

## 2025-04-07 PROCEDURE — 85610 PROTHROMBIN TIME: CPT

## 2025-04-07 PROCEDURE — 84484 ASSAY OF TROPONIN QUANT: CPT

## 2025-04-07 PROCEDURE — 85025 COMPLETE CBC W/AUTO DIFF WBC: CPT

## 2025-04-07 PROCEDURE — 36415 COLL VENOUS BLD VENIPUNCTURE: CPT

## 2025-04-07 RX ORDER — CLOPIDOGREL 300 MG/1
300 TABLET, FILM COATED ORAL ONCE
Qty: 1 TABLET | Refills: 0 | Status: SHIPPED | OUTPATIENT
Start: 2025-04-07 | End: 2025-04-08

## 2025-04-07 RX ORDER — POTASSIUM CHLORIDE 1500 MG/1
40 TABLET, EXTENDED RELEASE ORAL ONCE
Status: COMPLETED | OUTPATIENT
Start: 2025-04-07 | End: 2025-04-08

## 2025-04-07 RX ADMIN — IOHEXOL 85 ML: 350 INJECTION, SOLUTION INTRAVENOUS at 21:47

## 2025-04-08 ENCOUNTER — TELEPHONE (OUTPATIENT)
Age: 60
End: 2025-04-08

## 2025-04-08 ENCOUNTER — APPOINTMENT (OUTPATIENT)
Dept: MRI IMAGING | Facility: HOSPITAL | Age: 60
End: 2025-04-08
Payer: COMMERCIAL

## 2025-04-08 VITALS
HEIGHT: 62 IN | RESPIRATION RATE: 18 BRPM | SYSTOLIC BLOOD PRESSURE: 131 MMHG | OXYGEN SATURATION: 97 % | DIASTOLIC BLOOD PRESSURE: 78 MMHG | TEMPERATURE: 99.2 F | HEART RATE: 85 BPM | BODY MASS INDEX: 32.46 KG/M2 | WEIGHT: 176.37 LBS

## 2025-04-08 PROBLEM — K21.9 GERD (GASTROESOPHAGEAL REFLUX DISEASE): Status: ACTIVE | Noted: 2025-04-08

## 2025-04-08 LAB
ANION GAP SERPL CALCULATED.3IONS-SCNC: 6 MMOL/L (ref 4–13)
ATRIAL RATE: 73 BPM
BUN SERPL-MCNC: 9 MG/DL (ref 5–25)
CALCIUM SERPL-MCNC: 8.4 MG/DL (ref 8.4–10.2)
CHLORIDE SERPL-SCNC: 105 MMOL/L (ref 96–108)
CHOLEST SERPL-MCNC: 177 MG/DL (ref ?–200)
CO2 SERPL-SCNC: 26 MMOL/L (ref 21–32)
CREAT SERPL-MCNC: 0.91 MG/DL (ref 0.6–1.3)
ERYTHROCYTE [DISTWIDTH] IN BLOOD BY AUTOMATED COUNT: 13.8 % (ref 11.6–15.1)
GFR SERPL CREATININE-BSD FRML MDRD: 69 ML/MIN/1.73SQ M
GLUCOSE SERPL-MCNC: 109 MG/DL (ref 65–140)
HCT VFR BLD AUTO: 34.6 % (ref 34.8–46.1)
HDLC SERPL-MCNC: 49 MG/DL
HGB BLD-MCNC: 11.5 G/DL (ref 11.5–15.4)
LDLC SERPL CALC-MCNC: 118 MG/DL (ref 0–100)
MCH RBC QN AUTO: 27.6 PG (ref 26.8–34.3)
MCHC RBC AUTO-ENTMCNC: 33.2 G/DL (ref 31.4–37.4)
MCV RBC AUTO: 83 FL (ref 82–98)
P AXIS: 79 DEGREES
PLATELET # BLD AUTO: 319 THOUSANDS/UL (ref 149–390)
PMV BLD AUTO: 10.4 FL (ref 8.9–12.7)
POTASSIUM SERPL-SCNC: 4.1 MMOL/L (ref 3.5–5.3)
PR INTERVAL: 148 MS
QRS AXIS: 85 DEGREES
QRSD INTERVAL: 86 MS
QT INTERVAL: 406 MS
QTC INTERVAL: 447 MS
RBC # BLD AUTO: 4.17 MILLION/UL (ref 3.81–5.12)
SODIUM SERPL-SCNC: 137 MMOL/L (ref 135–147)
T WAVE AXIS: 75 DEGREES
TRIGL SERPL-MCNC: 52 MG/DL (ref ?–150)
VENTRICULAR RATE: 73 BPM
WBC # BLD AUTO: 6.28 THOUSAND/UL (ref 4.31–10.16)

## 2025-04-08 PROCEDURE — 97161 PT EVAL LOW COMPLEX 20 MIN: CPT

## 2025-04-08 PROCEDURE — 80061 LIPID PANEL: CPT

## 2025-04-08 PROCEDURE — 97165 OT EVAL LOW COMPLEX 30 MIN: CPT

## 2025-04-08 PROCEDURE — 80048 BASIC METABOLIC PNL TOTAL CA: CPT

## 2025-04-08 PROCEDURE — 70551 MRI BRAIN STEM W/O DYE: CPT

## 2025-04-08 PROCEDURE — 99204 OFFICE O/P NEW MOD 45 MIN: CPT | Performed by: PSYCHIATRY & NEUROLOGY

## 2025-04-08 PROCEDURE — 85027 COMPLETE CBC AUTOMATED: CPT

## 2025-04-08 PROCEDURE — 36415 COLL VENOUS BLD VENIPUNCTURE: CPT

## 2025-04-08 PROCEDURE — 93010 ELECTROCARDIOGRAM REPORT: CPT | Performed by: INTERNAL MEDICINE

## 2025-04-08 PROCEDURE — 83036 HEMOGLOBIN GLYCOSYLATED A1C: CPT

## 2025-04-08 RX ORDER — ATORVASTATIN CALCIUM 40 MG/1
40 TABLET, FILM COATED ORAL EVERY EVENING
Status: DISCONTINUED | OUTPATIENT
Start: 2025-04-08 | End: 2025-04-08 | Stop reason: HOSPADM

## 2025-04-08 RX ORDER — CLOPIDOGREL BISULFATE 75 MG/1
75 TABLET ORAL DAILY
Status: DISCONTINUED | OUTPATIENT
Start: 2025-04-08 | End: 2025-04-08 | Stop reason: HOSPADM

## 2025-04-08 RX ORDER — MONTELUKAST SODIUM 10 MG/1
10 TABLET ORAL DAILY
Status: DISCONTINUED | OUTPATIENT
Start: 2025-04-08 | End: 2025-04-08 | Stop reason: HOSPADM

## 2025-04-08 RX ORDER — LOSARTAN POTASSIUM 50 MG/1
100 TABLET ORAL DAILY
Status: DISCONTINUED | OUTPATIENT
Start: 2025-04-08 | End: 2025-04-08 | Stop reason: HOSPADM

## 2025-04-08 RX ORDER — MECLIZINE HCL 12.5 MG 12.5 MG/1
12.5 TABLET ORAL EVERY 8 HOURS PRN
Qty: 30 TABLET | Refills: 0 | Status: SHIPPED | OUTPATIENT
Start: 2025-04-08

## 2025-04-08 RX ORDER — ACETAMINOPHEN 325 MG/1
650 TABLET ORAL EVERY 4 HOURS PRN
Status: DISCONTINUED | OUTPATIENT
Start: 2025-04-08 | End: 2025-04-08 | Stop reason: HOSPADM

## 2025-04-08 RX ORDER — PANTOPRAZOLE SODIUM 40 MG/1
40 TABLET, DELAYED RELEASE ORAL DAILY
Status: DISCONTINUED | OUTPATIENT
Start: 2025-04-08 | End: 2025-04-08 | Stop reason: HOSPADM

## 2025-04-08 RX ORDER — LATANOPROST 50 UG/ML
1 SOLUTION/ DROPS OPHTHALMIC
Status: DISCONTINUED | OUTPATIENT
Start: 2025-04-08 | End: 2025-04-08 | Stop reason: HOSPADM

## 2025-04-08 RX ORDER — HYDROCHLOROTHIAZIDE 12.5 MG/1
12.5 TABLET ORAL DAILY
Status: DISCONTINUED | OUTPATIENT
Start: 2025-04-08 | End: 2025-04-08 | Stop reason: HOSPADM

## 2025-04-08 RX ADMIN — MONTELUKAST 10 MG: 10 TABLET, FILM COATED ORAL at 08:11

## 2025-04-08 RX ADMIN — POTASSIUM CHLORIDE 40 MEQ: 1500 TABLET, EXTENDED RELEASE ORAL at 00:36

## 2025-04-08 RX ADMIN — PANTOPRAZOLE SODIUM 40 MG: 40 TABLET, DELAYED RELEASE ORAL at 08:11

## 2025-04-08 RX ADMIN — CLOPIDOGREL 75 MG: 75 TABLET ORAL at 08:11

## 2025-04-08 NOTE — OCCUPATIONAL THERAPY NOTE
Occupational Therapy Evaluation        Patient Name: Roma Farias  Today's Date: 4/8/2025 04/08/25 0829   OT Last Visit   OT Visit Date 04/08/25   Note Type   Note type Evaluation   Pain Assessment   Pain Assessment Tool 0-10   Pain Score No Pain   Restrictions/Precautions   Weight Bearing Precautions Per Order No   Other Precautions Chair Alarm;Bed Alarm;Fall Risk;Telemetry   Home Living   Type of Home House   Home Layout Two level;Stairs to enter with rails;Bed/bath upstairs;1/2 bath on main level  (2 ROB)   Bathroom Shower/Tub Walk-in shower   Bathroom Toilet Standard   Bathroom Equipment Other (Comment)  (none reported)   Bathroom Accessibility Accessible   Home Equipment Other (Comment)  (Rollator)   Additional Comments ambulates w/ no AD   Prior Function   Level of [place] Independent with ADLs;Independent with functional mobility;Independent with IADLS   Lives With Spouse;Son;Daughter;Family   Receives Help From Family   IADLs Independent with driving;Independent with meal prep;Independent with medication management   Falls in the last 6 months 0   Vocational Full time employment  (Caregiver)   Lifestyle   Autonomy Pt reports residing with family in 2SH w/ 2 ROB. Pt reports being independent with all ADLs and IADLs. Pt reports ambulating with no AD at baseline.   Reciprocal Relationships Supportive Family   General   Family/Caregiver Present Yes  (spouse present)   ADL   Where Assessed Edge of bed   Eating Assistance 7  Independent   Grooming Assistance 7  Independent   UB Bathing Assistance 5  Supervision/Setup   LB Bathing Assistance 5  Supervision/Setup   UB Dressing Assistance 7  Independent   LB Dressing Assistance 7  Independent   Toileting Assistance  7  Independent   Functional Assistance 7  Independent   Bed Mobility   Supine to Sit 7  Independent   Sit to Supine 7  Independent   Transfers   Sit to Stand 7  Independent   Stand to Sit 7  Independent   Functional Mobility    Functional Mobility 7  Independent   Additional Comments Pt independent during ambulation in room and hallway.   Additional items   (no AD)   Balance   Static Sitting Normal   Dynamic Sitting Normal   Static Standing Good   Dynamic Standing Good   Activity Tolerance   Activity Tolerance Patient tolerated treatment well   Medical Staff Made Aware Pt seen as a co-eval with PT due to the patient's co-morbidities and clinically unstable presentation.  (PT Hermann)   RUE Assessment   RUE Assessment WFL   LUE Assessment   LUE Assessment WFL   Hand Function   Gross Motor Coordination Functional   Fine Motor Coordination Functional   Sensation   Light Touch No apparent deficits  (BUEs)   Vision-Basic Assessment   Current Vision Wears glasses only for reading   Vision - Complex Assessment   Ocular Range of Motion Intact   Tracking Intact   Acuity Able to read employee name badge without difficulty   Psychosocial   Psychosocial (WDL) WDL   Cognition   Overall Cognitive Status WFL   Arousal/Participation Alert;Responsive;Cooperative   Attention Within functional limits   Orientation Level Oriented X4   Memory Within functional limits   Following Commands Follows all commands and directions without difficulty   Assessment   Assessment Patient is a 60 y.o. female seen for OT evaluation s/p admit [facility]/7[facility] w/Dysequilibrium. PMHx and co-morbidities affecting patient's functional performance at time of assessment include: HTN, ocular HTN, GERD, chronic neck pain, chronic bilateral low back pain, and celiac disease. Orders placed for OT evaluation and treatment. Performed at least two patient identifiers during session including name and wristband. Pt reports residing with family in 2SH w/ 2 ROB. Pt reports being independent with all ADLs and IADLs. Pt reports ambulating with no AD at baseline. Upon evaluation, patient requires independent assist for UB ADLs, independent and supervision assist for LB  ADLs, transfers and functional ambulation in room and bathroom with independent assist, with the use of  no AD . Presents with functional use of BUEs, with intact prehension, coordination and symmetrical muscle strength. Patient appears to be functioning at baseline. No further acute OT needs identified at this time to warrant continuation of services. D/C OT services. From OT standpoint, recommendation at time of d/c would be no post-acute rehabilitation needs. Please re-consult if needs arise.   Discharge Recommendation   Rehab Resource Intensity Level, OT No post-acute rehabilitation needs   AM-PAC Daily Activity Inpatient   Lower Body Dressing 4   Bathing 4   Toileting 4   Upper Body Dressing 4   Grooming 4   Eating 4   Daily Activity Raw Score 24   Daily Activity Standardized Score (Calc for Raw Score >=11) 57.54   AM-PAC Applied Cognition Inpatient   Following a Speech/Presentation 4   Understanding Ordinary Conversation 4   Taking Medications 4   Remembering Where Things Are Placed or Put Away 4   Remembering List of 4-5 Errands 4   Taking Care of Complicated Tasks 4   Applied Cognition Raw Score 24   Applied Cognition Standardized Score 62.21   Barthel Index   Feeding 10   Bathing 5   Grooming Score 5   Dressing Score 10   Bladder Score 10   Bowels Score 10   Toilet Use Score 10   Transfers (Bed/Chair) Score 15   Mobility (Level Surface) Score 15   Stairs Score 10   Barthel Index Score 100   Modified [place] Scale   Modified [place]ford Scale 1   End of Consult   Patient Position at End of Consult Supine;All needs within reach         SHAUNA Schaefer, OTR/L

## 2025-04-08 NOTE — PLAN OF CARE
Problem: INFECTION - ADULT  Goal: Absence or prevention of progression during hospitalization  Description: INTERVENTIONS:- Assess and monitor for signs and symptoms of infection- Monitor lab/diagnostic results- Monitor all insertion sites, i.e. indwelling lines, tubes, and drains- Monitor endotracheal if appropriate and nasal secretions for changes in amount and color- [place] appropriate cooling/warming therapies per order- Administer medications as ordered- Instruct and encourage patient and family to use good hand hygiene technique- Identify and instruct in appropriate isolation precautions for identified infection/condition  Outcome: Progressing  Goal: Absence of fever/infection during neutropenic period  Description: INTERVENTIONS:- Monitor WBC  Outcome: Progressing     Problem: PAIN - ADULT  Goal: Verbalizes/displays adequate comfort level or baseline comfort level  Description: Interventions:- Encourage patient to monitor pain and request assistance- Assess pain using appropriate pain scale- Administer analgesics based on type and severity of pain and evaluate response- Implement non-pharmacological measures as appropriate and evaluate response- Consider cultural and social influences on pain and pain management- Notify physician/advanced practitioner if interventions unsuccessful or patient reports new pain  Outcome: Progressing

## 2025-04-08 NOTE — QUICK NOTE
Mrs. Farias is a 60-year-old female with a history of Htn, HLD, chronic neck pain, chronic b/l lower back pain, celiac disease, chronic superficial gastritis with bleeding, who presented to [facility] ED for evaluation of intermittent dizziness and disequilibrium.  Patient believes her symptoms started 10:00 in the morning today as she was off balance but no falls.  Patient described room spinning sensation which comes and goes and due to symptoms has been intermittent she decided to be evaluated in emergency department.    No focal weakness, no sensory loss, no visual disturbances, no dysarthric speech, no altered mental status.    Time stroke alert was called: 21:28 pm  Time neurology provider spoke with the ED:  21:29 pm  Patient Last Known Well (LKW) 10:00 am   BP upon arrival to ED: 137/77 mmHg  PT/INR/aPTT/BS/Platelets: 129  NIHS Score: 0    CTH: no acute pathology, left cerebellar hemisphere cystic lesion  CTA head/neck: no LVO    Recommendations:  IV thrombolysis was not given due to patient symptoms are non-disabling.   Recommend loading with Plavix 300 mg, continue Plavix 75 mg  Goal MAP> 100, and SBP < 210 mmHg  LDL Goal <70 mg/dL  Admit to stroke pathway

## 2025-04-08 NOTE — ASSESSMENT & PLAN NOTE
60-year-old female presents to ED complaining of dizziness starting at 10 AM that acutely worsened at 5 PM  NIH 0  CTA negative for large vessel occlusion, MRI brain pending  VSS, potassium 3.4 - repletion ordered.  Otherwise labs WNL  Per neurology, admit on stroke pathway  Patient was loaded with 300 mg Plavix in ED, continue 75 mg daily  Holding PTA BP meds to promote permissive hypertension up to  mmHg  Lipid panel ordered, plan to follow-up, atorvastatin initiated  Frequent neurochecks per stroke pathway protocol  Telemetry monitoring  Neurology consulted, appreciate further recommendations

## 2025-04-08 NOTE — ASSESSMENT & PLAN NOTE
Hold PTA BP medication to promote hypertension with SBP up to 210 mmHg  BP stable since admission, continue to monitor

## 2025-04-08 NOTE — TELEPHONE ENCOUNTER
STILL ADMITTED:4/7/2025 - present (1 day)  [facility]    1ST ATTEMPT,     VIA [employer]     Thank you,     Ching         JOSLYN/ CATRACHITO COKER/ Peripheral vertigo     ----- Message from Keyla Damon PA-C sent at 4/8/2025 12:35 PM EDT -----  Regarding: HFU  Roma Farias will need follow-up in in 6 weeks with general neurology team for Peripheral vertigo in 60 minute appointment. They will not require outpatient neurological testing.

## 2025-04-08 NOTE — H&P
H&P - Hospitalist   Name: Roma Farias 60 y.o. female I MRN: [id number]  Unit/Bed#: ED 24 I Date of Admission: 4/7/2025   Date of Service: 4/8/2025 I Hospital Day: 0     Assessment & Plan  Dysequilibrium  60-year-old female presents to ED complaining of dizziness starting at 10 AM that acutely worsened at 5 PM  NIH 0  CTA negative for large vessel occlusion, MRI brain pending  VSS, potassium 3.4 - repletion ordered.  Otherwise labs WNL  Per neurology, admit on stroke pathway  Patient was loaded with 300 mg Plavix in ED, continue 75 mg daily  Holding PTA BP meds to promote permissive hypertension up to  mmHg  Lipid panel ordered, plan to follow-up, atorvastatin initiated  Frequent neurochecks per stroke pathway protocol  Telemetry monitoring  Neurology consulted, appreciate further recommendations  Essential hypertension  Hold PTA BP medication to promote hypertension with SBP up to 210 mmHg  BP stable since admission, continue to monitor  Ocular hypertension, bilateral  Continue PTA latanoprost drops  GERD (gastroesophageal reflux disease)  Continue PTA Protonix      VTE Pharmacologic Prophylaxis: VTE Score: 2 Low Risk (Score 0-2) - Encourage Ambulation.  Code Status: Level 1 - Full Code   Discussion with family: Updated  () at bedside.    Anticipated Length of Stay: Patient will be admitted on an observation basis with an anticipated length of stay of less than 2 midnights secondary to stroke pathway.    History of Present Illness   Chief Complaint:   Chief Complaint   Patient presents with    Dizziness     Pt states she felt off-balance when she went to work this morning. Pt now states she feels the room is spinning, dry mouth, and chills. Denies chest pain and SOB.       Roma Farias is a 60 y.o. female with a PMH of HTN, ocular hypertension, celiac disease who presents with dizziness.  Patient states dizziness started today at 10 AM and acutely worsened around 5 PM prompting  "her to seek ED evaluation.  Denies headache, visual disturbances, facial droop, slurred speech.  States she felt like the room was spinning around her.  Admits to history of dizziness although states it has previously resolved on its own and \"never been this severe\".  Admits to chills this morning that have since resolved.  Denies fever, chest pain or shortness of breath.    Review of Systems   Constitutional:  Negative for chills and fever.   Respiratory:  Negative for shortness of breath.    Cardiovascular:  Negative for chest pain.   Gastrointestinal:  Negative for abdominal pain, constipation, diarrhea, nausea and vomiting.   Neurological:  Positive for dizziness. Negative for tremors, syncope, facial asymmetry, speech difficulty, weakness, numbness and headaches.   Psychiatric/Behavioral:  Negative for agitation and confusion.        Historical Information   Past Medical History:   Diagnosis Date    Celiac disease     Colon polyp     GERD (gastroesophageal reflux disease)     Hyperlipidemia 2021    Hypertension 1998     Past Surgical History:   Procedure Laterality Date    COLONOSCOPY      UPPER GASTROINTESTINAL ENDOSCOPY       Social History     Tobacco Use    Smoking status: Never    Smokeless tobacco: Never   Vaping Use    Vaping status: Never Used   Substance and Sexual Activity    Alcohol use: Never    Drug use: Never    Sexual activity: Yes     Partners: Male     Birth control/protection: None     E-Cigarette/Vaping    E-Cigarette Use Never User      E-Cigarette/Vaping Substances    Nicotine No     THC No     CBD No     Flavoring No     Other No     Unknown No      Family History   Problem Relation Age of Onset    Hypertension Mother     Lupus Mother     Seizures Mother     Glaucoma Mother     Stomach cancer Sister     No Known Problems Sister     No Known Problems Sister     No Known Problems Daughter     Alzheimer's disease Maternal Grandmother     No Known Problems Paternal Grandmother     No Known " Problems Maternal Aunt     No Known Problems Paternal Aunt     No Known Problems Paternal Aunt     No Known Problems Paternal Aunt     Breast cancer Neg Hx     Colon cancer Neg Hx     Ovarian cancer Neg Hx     Uterine cancer Neg Hx     Cervical cancer Neg Hx      Social History:  Marital Status: /Civil Union   Occupation:   Patient Pre-hospital Living Situation: Home  Patient Pre-hospital Level of Mobility: walks  Patient Pre-hospital Diet Restrictions:     Meds/Allergies   I have reviewed home medications using recent Epic encounter.  Prior to Admission medications    Medication Sig Start Date End Date Taking? Authorizing Provider   clopidogrel (PLAVIX) 300 mg Take 1 tablet (300 mg total) by mouth once for 1 dose 4/7/25 4/7/25 Yes Zoraida Kinsey PA-C   Diclofenac Sodium (VOLTAREN) 1 % APPLY 2G TOPICALLY TO AFFECTED AREA 4 TIMES A DAY FOR 20 DAYS. 4/2/24   Historical Provider, MD   latanoprost (XALATAN) 0.005 % ophthalmic solution  2/20/23   Historical Provider, MD   losartan-hydrochlorothiazide (HYZAAR) 100-12.5 MG per tablet TAKE 1 TABLET BY MOUTH EVERY DAY 2/27/23   Eric Cardenas MD   montelukast (SINGULAIR) 10 mg tablet Take 10 mg by mouth 3/27/24   Historical Provider, MD   pantoprazole (PROTONIX) 40 mg tablet Take 1 tablet (40 mg total) by mouth daily 3/7/25   Jules Thomas III, MD     No Known Allergies    Objective :  Temp:  [98.1 °F (36.7 °C)] 98.1 °F (36.7 °C)  HR:  [64-79] 67  BP: (117-148)/(58-82) 117/61  Resp:  [16-23] 20  SpO2:  [95 %-100 %] 95 %  O2 Device: None (Room air)    Physical Exam  Vitals reviewed.   Constitutional:       General: She is not in acute distress.     Appearance: She is not ill-appearing, toxic-appearing or diaphoretic.   Eyes:      Extraocular Movements: Extraocular movements intact.      Pupils: Pupils are equal, round, and reactive to light.   Cardiovascular:      Rate and Rhythm: Normal rate and regular rhythm.   Pulmonary:      Effort: Pulmonary effort is  normal. No respiratory distress.      Breath sounds: Normal breath sounds.   Abdominal:      General: Bowel sounds are normal. There is no distension.      Palpations: Abdomen is soft.      Tenderness: There is no abdominal tenderness.   Musculoskeletal:         General: No swelling.      Right lower leg: No edema.      Left lower leg: No edema.   Skin:     General: Skin is warm and dry.   Neurological:      General: No focal deficit present.      Mental Status: She is alert and oriented to person, place, and time.      GCS: GCS eye subscore is 4. GCS verbal subscore is 5. GCS motor subscore is 6.      Cranial Nerves: No dysarthria or facial asymmetry.      Sensory: Sensation is intact. No sensory deficit.      Motor: Motor function is intact. No weakness or tremor.      Coordination: Coordination normal.         Lab Results: I have reviewed the following results:  Results from last 7 days   Lab Units 04/07/25  2130   WBC Thousand/uL 5.72   HEMOGLOBIN g/dL 11.9   HEMATOCRIT % 36.0   PLATELETS Thousands/uL 327   SEGS PCT % 53   LYMPHO PCT % 41   MONO PCT % 5   EOS PCT % 1     Results from last 7 days   Lab Units 04/07/25  2130   SODIUM mmol/L 139   POTASSIUM mmol/L 3.4*   CHLORIDE mmol/L 103   CO2 mmol/L 28   BUN mg/dL 10   CREATININE mg/dL 1.00   ANION GAP mmol/L 8   CALCIUM mg/dL 9.2   ALBUMIN g/dL 4.1   TOTAL BILIRUBIN mg/dL 0.50   ALK PHOS U/L 82   ALT U/L 18   AST U/L 21   GLUCOSE RANDOM mg/dL 109     Results from last 7 days   Lab Units 04/07/25  2130   INR  0.86     Results from last 7 days   Lab Units 04/07/25  2131   POC GLUCOSE mg/dl 129     Lab Results   Component Value Date    HGBA1C 5.1 03/26/2025    HGBA1C 5.2 01/08/2024    HGBA1C 5.1 01/21/2023           Imaging Results Review: I reviewed radiology reports from this admission including: CT stroke alert brain, CTA stroke alert (head/neck).  Other Study Results Review: EKG was personally reviewed and my interpretation is: NSR. HR 73  bpm..    Administrative Statements   I have spent a total time of 60 minutes in caring for this patient on the day of the visit/encounter including Diagnostic results, Risks and benefits of tx options, Instructions for management, Patient and family education, Importance of tx compliance, Impressions, Counseling / Coordination of care, Documenting in the medical record, Reviewing/placing orders in the medical record (including tests, medications, and/or procedures), Obtaining or reviewing history  , and Communicating with other healthcare professionals .    ** Please Note: This note has been constructed using a voice recognition system. **

## 2025-04-08 NOTE — PHYSICAL THERAPY NOTE
Physical Therapy Evaluation    Patient's Name: Roma Farias    Admitting Diagnosis  Dizziness [R42]  Dysequilibrium [R42]  Stroke-like symptoms [R29.90]    Problem List  Patient Active Problem List   Diagnosis    Essential hypertension    Hypercholesteremia    Ocular hypertension, bilateral    Left upper quadrant abdominal pain    Chronic neck pain    Chronic bilateral low back pain without sciatica    Obesity (BMI 30-39.9)    Chronic superficial gastritis with bleeding    Celiac disease    Dysequilibrium    GERD (gastroesophageal reflux disease)       Past Medical History  Past Medical History:   Diagnosis Date    Celiac disease     Colon polyp     GERD (gastroesophageal reflux disease)     Hyperlipidemia 2021    Hypertension 1998       Past Surgical History  Past Surgical History:   Procedure Laterality Date    COLONOSCOPY      UPPER GASTROINTESTINAL ENDOSCOPY         Recent Imaging  MRI brain wo contrast   Final Result by Higinio Torres MD (04/08 0820)      1.  No acute infarction, intracranial hemorrhage.   2.  1.6 cm arachnoid cyst in the left posterior fossa.      Workstation performed: [id number]         CTA stroke alert (head/neck)   Final Result by Nick Rouse MD (04/07 2240)   1.  No large vessel arterial occlusion, significant flow-limiting stenosis, or focal saccular aneurysm identified.   2.  1.8 x 1.3 cm ovoid cystic focus in the left cerebellum could represent an arachnoid cyst or other etiology. No internal or rim enhancement of this lesion demonstrated.   3.  Nonspecific 5 mm heterogeneous hypodense right thyroid nodule is seen. No routine follow-up imaging recommended per current guidelines.               Findings were directly discussed with Dr. Sandoval at approximately 10:29 p.m.      Workstation performed: [id number]         CT stroke alert brain   Final Result by Nick Rouse MD (04/07 2234)      No acute intracranial hemorrhage. No CT evidence of acute large vascular  "territory transcortical cerebral infarction identified. If clinical concern for acute ischemia, consider more sensitive MRI brain for better evaluation. 1.8 x 1.3 cm CSF density    focus in the left cerebellum with punctate adjacent calcifications noted which may represent arachnoid cyst or other cystic lesion. Comparison with any prior available outside studies will be helpful. This could be better evaluated with contrast-enhanced    MRI brain.      Findings were directly discussed with Dr. Sandoval at approximately 10:29 p.m.      Workstation performed: [id number]             Recent Vital Signs  Vitals:    04/08/25 0745 04/08/25 0920 04/08/25 0925 04/08/25 1120   BP: 126/67 120/80     BP Location:  Left arm     Pulse: 73 75     Resp: 22 18  18   Temp:  98.2 °F (36.8 °C)  99.5 °F (37.5 °C)   TempSrc:  Oral  Oral   SpO2: 100% 99%     Weight:   80 kg (176 lb 5.9 oz)    Height:   5' 2\" (1.575 m)           04/08/25 0840   Note Type   Note type Evaluation   Pain Assessment   Pain Assessment Tool 0-10   Pain Score No Pain   Restrictions/Precautions   Weight Bearing Precautions Per Order No   Other Precautions Chair Alarm;Bed Alarm   Home Living   Type of Home House   Home Layout Two level;1/2 bath on main level;Bed/bath upstairs;Stairs to enter with rails  (2 ROB)   Bathroom Shower/Tub Walk-in shower   Bathroom Toilet Standard   Bathroom Equipment Other (Comment)  (none reported)   Bathroom Accessibility Accessible   Home Equipment   (rollator)   Additional Comments ambulates w/ no AD   Prior Function   Level of [place] Independent with ADLs;Independent with functional mobility;Independent with IADLS   Lives With Spouse;Son;Daughter;Family   Receives Help From Family   IADLs Independent with driving;Independent with meal prep;Independent with medication management   Falls in the last 6 months 0   Vocational Full time employment  (Caregiver)   Cognition   Overall Cognitive Status WFL   Attention Within functional " limits   Orientation Level Oriented X4   Memory Within functional limits   Following Commands Follows all commands and directions without difficulty   Comments Pt agreeable to PT session   RLE Assessment   RLE Assessment WFL   LLE Assessment   LLE Assessment WFL   Vision-Basic Assessment   Current Vision Wears glasses only for reading   Coordination   Sensation WFL   Light Touch   RLE Light Touch Grossly intact   LLE Light Touch Grossly intact   Bed Mobility   Supine to Sit 7  Independent   Sit to Supine 7  Independent   Transfers   Sit to Stand 7  Independent   Stand to Sit 7  Independent   Ambulation/Elevation   Gait pattern WNL   Gait Assistance 7  Independent   Assistive Device None   Distance 80'   Stair Management Assistance 7  Independent   Stair Management Technique No rails   Number of Stairs 6   Balance   Static Sitting Normal   Dynamic Sitting Normal   Static Standing Good   Dynamic Standing Good   Ambulatory Good   Activity Tolerance   Activity Tolerance Patient tolerated treatment well   Medical Staff Made Aware OT Nichole, Pt seen as co-evaluation with OT due to pt's co-morbidities clinically unstable presentation and present impairments.   Assessment   Prognosis Good   Problem List Decreased mobility   Assessment Roma Farias is a 60 y.o. female admitted to [facility] on 4/7/2025 for Dysequilibrium, GERD, Ocular hypertension, bilateral. Pt  has a past medical history of Celiac disease, Colon polyp, GERD (gastroesophageal reflux disease), Hyperlipidemia (2021), and Hypertension (1998).. Order placed for PT eval and tx. PT was consulted and pt was seen on 4/8/2025 for mobility assessment and d/c planning. Chart review and two person identifiers were completed.   Currently pt presents with decreased strength , decreased static sitting balance , decreased dynamic sitting balance , decreased static standing balance, decreased dynamic standing balance , decreased gait speed, decreased step length , and decreased  muscular endurance . Due to these impairments, they will require assistance to perform bed mobility, sit to stand , ambulation, stair negotiation, and transfers. Pt is currently functioning at a independent level for bed mobility, independent level for transfers, independent level for ambulation with no assistive device, and independent for stair climbing/elevations.  The patient's AM-PAC Basic Mobility Inpatient Short Form Raw Score is 24. PT recommends level III minimum resource intensity. Pt has no post acute PT needs, d/c PT orders. Please re-consult if needs arise.   Barriers to Discharge None   Discharge Recommendation   Rehab Resource Intensity Level, PT III (Minimum Resource Intensity)   AM-PAC Basic Mobility Inpatient   Turning in Flat Bed Without Bedrails 4   Lying on Back to Sitting on Edge of Flat Bed Without Bedrails 4   Moving Bed to Chair 4   Standing Up From Chair Using Arms 4   Walk in Room 4   Climb 3-5 Stairs With Railing 4   Basic Mobility Inpatient Raw Score 24   Basic Mobility Standardized Score 57.68   [facility] Highest Level Of Mobility   -HLM Goal 8: Walk 250 feet or more   -HLM Achieved 7: Walk 25 feet or more   End of Consult   Patient Position at End of Consult Supine;All needs within reach        Recommendations                                                                                                              The patient's AM-PAC Basic Mobility Inpatient Short Form Raw Score is 24. PT recommends level III minimum resource intensity. Pt has no post acute PT needs, d/c PT orders. Please re-consult if needs arise.    PT Evaluation Time: 0829-0840    Hermann Jara, PT, DPT

## 2025-04-08 NOTE — SPEECH THERAPY NOTE
Speech Language/Pathology      Patient passed nursing dysphagia screen; presently tolerating oral diet.  Need for formal evaluation of swallow function is not indicated at this time.  Please re-order should status change or concerns arise.     Nirmala Bartlett MS, CCC-SLP  Speech-Language Pathologist  [place] #[id number]  [place] #[id number]

## 2025-04-08 NOTE — NURSING NOTE
Pt aox4 pleasant , denies pain, sob ,dizziness, chest pain and eager to go home, monitors disconnected, IV removed, personal items collected, AVS reviewed wit pt and confirmed RX. Pt declined wheelchair. Pt NIH 0 at AR.

## 2025-04-08 NOTE — UTILIZATION REVIEW
"Initial Clinical Review    Admission: Date/Time/Statement:   Admission Orders (From admission, onward)       Ordered        04/07/25 2326  Place in Observation  Once                          Orders Placed This Encounter   Procedures    Place in Observation     Standing Status:   Standing     Number of Occurrences:   1     Level of Care:   Med Surg [16]     ED Arrival Information       Expected   -    Arrival   4/7/2025 20:56    Acuity   Urgent              Means of arrival   Walk-In    Escorted by   Spouse    Service   Hospitalist    Admission type   Emergency              Arrival complaint   Dizziness             Chief Complaint   Patient presents with    Dizziness     Pt states she felt off-balance when she went to work this morning. Pt now states she feels the room is spinning, dry mouth, and chills. Denies chest pain and SOB.        Initial Presentation: 60 y.o. female  PMH of HTN, ocular hypertension, celiac disease presented to the ED from home w/ dizziness.   Pt reports dizziness started today at 10 AM and acutely worsened around 5 PM. Described like feeling the room spinning around her. Reports to h/o  dizziness although states it has previously resolved on its own and \"never been this severe\".Had chills this am but has been resolved.  In the ED, CTA negative for large vessel occlusion. NIH 0. VSS, potassium 3.4, repleted.  loaded with 300 mg Plavix.    Admit as observation level of care for dysequilibrium.  Plan: MRI brain pending   Continue Plavix 75 mg daily  Holding PTA BP meds to promote permissive hypertension up to  mmHg  Lipid panel ordered, plan to follow-up, atorvastatin initiated  Frequent neurochecks per stroke pathway protocol  Telemetry monitoring  Neurology consulted            Anticipated Length of Stay/Certification Statement: Patient will be admitted on an observation basis with an anticipated length of stay of less than 2 midnights secondary to stroke pathway.       Date:    Day 2: "     ED Treatment-Medication Administration from 04/07/2025 2055 to 04/08/2025 0825         Date/Time Order Dose Route Action     04/07/2025 2147 iohexol (OMNIPAQUE) 350 MG/ML injection (MULTI-DOSE) 85 mL 85 mL Intravenous Given     04/08/2025 0036 potassium chloride (Klor-Con M20) CR tablet 40 mEq 40 mEq Oral Given     04/08/2025 0900 losartan (COZAAR) tablet 100 mg -- Oral Held Dose     04/09/2025 0900 losartan (COZAAR) tablet 100 mg -- Oral Held Dose     04/10/2025 0900 losartan (COZAAR) tablet 100 mg -- Oral Held Dose     04/11/2025 0900 losartan (COZAAR) tablet 100 mg -- Oral Held Dose     04/08/2025 0900 hydroCHLOROthiazide tablet 12.5 mg -- Oral Held Dose     04/09/2025 0900 hydroCHLOROthiazide tablet 12.5 mg -- Oral Held Dose     04/10/2025 0900 hydroCHLOROthiazide tablet 12.5 mg -- Oral Held Dose     04/11/2025 0900 hydroCHLOROthiazide tablet 12.5 mg -- Oral Held Dose     04/08/2025 0811 montelukast (SINGULAIR) tablet 10 mg 10 mg Oral Given     04/08/2025 0811 pantoprazole (PROTONIX) EC tablet 40 mg 40 mg Oral Given     04/08/2025 0811 clopidogrel (PLAVIX) tablet 75 mg 75 mg Oral Given            Scheduled Medications:  atorvastatin, 40 mg, Oral, QPM  clopidogrel, 75 mg, Oral, Daily  [Held by provider] losartan, 100 mg, Oral, Daily   And  [Held by provider] hydroCHLOROthiazide, 12.5 mg, Oral, Daily  latanoprost, 1 drop, Both Eyes, HS  montelukast, 10 mg, Oral, Daily  pantoprazole, 40 mg, Oral, Daily      Continuous IV Infusions:     PRN Meds:  acetaminophen, 650 mg, Oral, Q4H PRN      ED Triage Vitals   Temperature Pulse Respirations Blood Pressure SpO2 Pain Score   04/07/25 2102 04/07/25 2102 04/07/25 2102 04/07/25 2102 04/07/25 2102 04/07/25 2130   98.1 °F (36.7 °C) 75 16 137/77 100 % No Pain     Weight (last 2 days)       Date/Time Weight    04/07/25 2132 80 (176.37)    04/07/25 2102 77.2 (170.2)            Vital Signs (last 3 days)       Date/Time Temp Pulse Resp BP MAP (mmHg) SpO2 O2 Device Patient  Position - Orthostatic VS [place] Coma Scale Score Pain    04/08/25 0745 -- 73 22 126/67 91 100 % -- -- -- --    04/08/25 0700 -- 67 18 104/55 74 98 % -- -- -- --    04/08/25 0645 -- -- -- -- -- -- -- -- 15 No Pain    04/08/25 0630 -- 66 13 99/55 74 97 % -- -- -- --    04/08/25 0600 -- 70 20 94/55 70 96 % -- -- -- --    04/08/25 0553 -- -- -- -- -- -- -- -- 15 No Pain    04/08/25 0530 -- 69 20 124/65 86 97 % -- -- -- --    04/08/25 0500 -- 63 20 115/57 80 96 % -- -- -- --    04/08/25 0445 -- -- -- -- -- -- -- -- 15 No Pain    04/08/25 0430 -- 68 19 100/59 77 95 % -- -- -- --    04/08/25 0400 -- 70 20 92/54 70 96 % -- -- -- --    04/08/25 0345 -- -- -- -- -- -- -- -- 15 No Pain    04/08/25 0330 -- 75 21 104/54 74 94 % -- -- -- --    04/08/25 0300 -- 70 22 99/55 74 95 % -- -- -- --    04/08/25 0245 -- 71 22 109/59 76 96 % None (Room air) Sitting 15 --    04/08/25 0215 -- 69 21 114/67 -- 97 % None (Room air) Sitting -- --    04/08/25 0145 -- 76 21 118/67 87 96 % None (Room air) Sitting 15 --    04/08/25 0045 -- -- -- -- -- -- -- -- 15 --    04/08/25 0030 -- 64 21 127/66 90 96 % None (Room air) Lying 15 --    04/08/25 0015 -- 67 20 117/61 84 95 % None (Room air) Lying 15 No Pain    04/08/25 0000 -- 64 20 117/58 82 97 % None (Room air) Lying 15 No Pain    04/07/25 2351 -- -- -- -- -- -- -- -- 15 No Pain    04/07/25 2345 -- 69 20 131/62 89 98 % None (Room air) Sitting -- --    04/07/25 2330 -- 69 20 123/73 93 97 % None (Room air) Sitting -- --    04/07/25 2315 -- 79 23 124/71 93 97 % None (Room air) Sitting 15 No Pain    04/07/25 2300 -- 74 18 128/71 94 97 % None (Room air) Sitting 15 No Pain    04/07/25 2245 -- 73 18 118/66 -- 97 % None (Room air) Sitting 15 No Pain    04/07/25 2230 -- 71 19 117/62 84 -- None (Room air) Sitting 15 --    04/07/25 2215 -- 76 18 126/72 -- 97 % None (Room air) -- 15 --    04/07/25 2200 -- 75 17 148/64 -- 97 % None (Room air) -- 15 --    04/07/25 2145 -- 74 18 148/62 -- 97 % None (Room air)  -- 15 --    04/07/25 2130 -- 70 16 147/82 108 97 % None (Room air) Sitting 15 No Pain    04/07/25 2128 -- -- -- -- -- -- None (Room air) -- -- --    04/07/25 2127 -- 70 18 136/72 -- 97 % -- -- 15 --    04/07/25 2111 -- -- -- -- -- -- -- -- 15 --    04/07/25 2102 98.1 °F (36.7 °C) 75 16 137/77 102 100 % None (Room air) Sitting -- --              Pertinent Labs/Diagnostic Test Results:   Radiology:  MRI brain wo contrast   Final Interpretation by Higinio Torres MD (04/08 0820)      1.  No acute infarction, intracranial hemorrhage.   2.  1.6 cm arachnoid cyst in the left posterior fossa.      Workstation performed: [id number]         CTA stroke alert (head/neck)   Final Interpretation by Nick Rouse MD (04/07 2240)   1.  No large vessel arterial occlusion, significant flow-limiting stenosis, or focal saccular aneurysm identified.   2.  1.8 x 1.3 cm ovoid cystic focus in the left cerebellum could represent an arachnoid cyst or other etiology. No internal or rim enhancement of this lesion demonstrated.   3.  Nonspecific 5 mm heterogeneous hypodense right thyroid nodule is seen. No routine follow-up imaging recommended per current guidelines.               Findings were directly discussed with Dr. Sandoval at approximately 10:29 p.m.      Workstation performed: [id number]         CT stroke alert brain   Final Interpretation by Nick Rouse MD (04/07 2234)      No acute intracranial hemorrhage. No CT evidence of acute large vascular territory transcortical cerebral infarction identified. If clinical concern for acute ischemia, consider more sensitive MRI brain for better evaluation. 1.8 x 1.3 cm CSF density    focus in the left cerebellum with punctate adjacent calcifications noted which may represent arachnoid cyst or other cystic lesion. Comparison with any prior available outside studies will be helpful. This could be better evaluated with contrast-enhanced    MRI brain.      Findings were directly  "discussed with Dr. Sandoval at approximately 10:29 p.m.      Workstation performed: [id number]           Cardiology:  ECG 12 lead    by Interface, Ris Results In (04/07 2111)        GI:  No orders to display           Results from last 7 days   Lab Units 04/08/25 0411 04/07/25  2130   WBC Thousand/uL 6.28 5.72   HEMOGLOBIN g/dL 11.5 11.9   HEMATOCRIT % 34.6* 36.0   PLATELETS Thousands/uL 319 327   TOTAL NEUT ABS Thousands/µL  --  3.01         Results from last 7 days   Lab Units 04/08/25  0411 04/07/25  2130   SODIUM mmol/L 137 139   POTASSIUM mmol/L 4.1 3.4*   CHLORIDE mmol/L 105 103   CO2 mmol/L 26 28   ANION GAP mmol/L 6 8   BUN mg/dL 9 10   CREATININE mg/dL 0.91 1.00   EGFR ml/min/1.73sq m 69 61   CALCIUM mg/dL 8.4 9.2     Results from last 7 days   Lab Units 04/07/25  2130   AST U/L 21   ALT U/L 18   ALK PHOS U/L 82   TOTAL PROTEIN g/dL 7.5   ALBUMIN g/dL 4.1   TOTAL BILIRUBIN mg/dL 0.50     Results from last 7 days   Lab Units 04/07/25  2131   POC GLUCOSE mg/dl 129     Results from last 7 days   Lab Units 04/08/25  0411 04/07/25  2130   GLUCOSE RANDOM mg/dL 109 109             No results found for: \"BETA-HYDROXYBUTYRATE\"                   Results from last 7 days   Lab Units 04/07/25  2130   HS TNI 0HR ng/L 4         Results from last 7 days   Lab Units 04/07/25  2130   PROTIME seconds 12.4   INR  0.86   PTT seconds 24                                                                                                               Past Medical History:   Diagnosis Date    Celiac disease     Colon polyp     GERD (gastroesophageal reflux disease)     Hyperlipidemia 2021    Hypertension 1998     Present on Admission:   Essential hypertension   Ocular hypertension, bilateral      Admitting Diagnosis: Dizziness [R42]  Age/Sex: 60 y.o. female    Network Utilization Review Department  ATTENTION: Please call with any questions or concerns to [phone] and carefully listen to the prompts so that you are directed to " the right person. All voicemails are confidential.   For Discharge needs, contact Care Management DC Support Team at [phone] opt. 2  Send all requests for admission clinical reviews, approved or denied determinations and any other requests to dedicated fax number below belonging to the campus where the patient is receiving treatment. List of dedicated fax numbers for the Facilities:  FACILITY NAME UR FAX NUMBER   ADMISSION DENIALS (Administrative/Medical Necessity) [phone]   DISCHARGE SUPPORT TEAM (NETWORK) [phone]   PARENT CHILD HEALTH (Maternity/NICU/Pediatrics) [phone]   [facility] [phone]   [facility] [phone]   Critical access hospital [phone]   [facility] [phone]   [facility] [phone]   [facility] [phone]   [facility] [phone]   [facility] [phone]   [facility] [phone]   [facility] [phone]   [facility] [phone]   [facility] [phone]

## 2025-04-08 NOTE — ED PROVIDER NOTES
Time reflects when diagnosis was documented in both MDM as applicable and the Disposition within this note       Time User Action Codes Description Comment    4/7/2025  9:28 PM Zoraida Kinsey Add [R42] Dysequilibrium     4/7/2025 10:57 PM Zoraida Kinsey [R29.90] Stroke-like symptoms           ED Disposition       ED Disposition   Admit    Condition   Stable    Date/Time   Mon Apr 7, 2025 11:19 PM    Comment   Case was discussed with internal medicine and the patient's admission status was agreed to be Admission Status: inpatient status to the service of Dr. Rodriguez .               Assessment & Plan       Medical Decision Making  60-year-old female past medical history of hypertension, hyperlipidemia, ocular hypertension, celiac disease presenting for evaluation of dizziness beginning today at 10am. Also with intermittent vertigo sensation and nausea.  On exam patient in no acute distress, vital signs are stable.  NIH of 0.  DDx including but not limited to: metabolic abnormality, cardiac abnormality, intracranial process, adverse reaction; doubt infectious process.  Stroke alert called due to disequilibrium feeling.  CTH and CTA with no acute findings.  Lab work otherwise unremarkable.  EKG with no acute ST or T wave changes. Neurology recommending patient be admitted on stroke pathway with Plavix loading. Patient accepted to [facility].     Amount and/or Complexity of Data Reviewed  Labs: ordered.  Radiology: ordered.    Risk  Prescription drug management.  Decision regarding hospitalization.             Medications   latanoprost (XALATAN) 0.005 % ophthalmic solution 1 drop (1 drop Both Eyes Not Given 4/8/25 0302)   losartan (COZAAR) tablet 100 mg ( Oral Held Dose 4/11/25 0900)     And   hydroCHLOROthiazide tablet 12.5 mg ( Oral Held Dose 4/11/25 0900)   montelukast (SINGULAIR) tablet 10 mg (has no administration in time range)   pantoprazole (PROTONIX) EC tablet 40 mg (has no administration in time range)   acetaminophen  (TYLENOL) tablet 650 mg (has no administration in time range)   atorvastatin (LIPITOR) tablet 40 mg (40 mg Oral Not Given 4/8/25 0228)   clopidogrel (PLAVIX) tablet 75 mg (has no administration in time range)   iohexol (OMNIPAQUE) 350 MG/ML injection (MULTI-DOSE) 85 mL (85 mL Intravenous Given 4/7/25 2147)   potassium chloride (Klor-Con M20) CR tablet 40 mEq (40 mEq Oral Given 4/8/25 0036)       ED Risk Strat Scores         Stroke Assessment       Row Name 04/07/25 2203             NIH Stroke Scale    Interval --      Level of Consciousness (1a.) 0      LOC Questions (1b.) 0      LOC Commands (1c.) 0      Best Gaze (2.) 0      Visual (3.) 0      Facial Palsy (4.) 0      Motor Arm, Left (5a.) 0      Motor Arm, Right (5b.) 0      Motor Leg, Left (6a.) 0      Motor Leg, Right (6b.) 0      Limb Ataxia (7.) 0      Sensory (8.) 0      Best Language (9.) 0      Dysarthria (10.) 0      Extinction and Inattention (11.) (Formerly Neglect) 0      Total 0                                      SBIRT 20yo+      Flowsheet Row Most Recent Value   Initial Alcohol Screen: US AUDIT-C     1. How often do you have a drink containing alcohol? 0 Filed at: 04/07/2025 2104   2. How many drinks containing alcohol do you have on a typical day you are drinking?  0 Filed at: 04/07/2025 2104   3b. FEMALE Any Age, or MALE 65+: How often do you have 4 or more drinks on one occassion? 0 Filed at: 04/07/2025 2104   Audit-C Score 0 Filed at: 04/07/2025 2104   HOLA: How many times in the past year have you...    Used an illegal drug or used a prescription medication for non-medical reasons? Never Filed at: 04/07/2025 2104                            History of Present Illness       Chief Complaint   Patient presents with    Dizziness     Pt states she felt off-balance when she went to work this morning. Pt now states she feels the room is spinning, dry mouth, and chills. Denies chest pain and SOB.        Past Medical History:   Diagnosis Date    Celiac  "disease     Colon polyp     GERD (gastroesophageal reflux disease)     Hyperlipidemia 2021    Hypertension 1998      Past Surgical History:   Procedure Laterality Date    COLONOSCOPY      UPPER GASTROINTESTINAL ENDOSCOPY        Family History   Problem Relation Age of Onset    Hypertension Mother     Lupus Mother     Seizures Mother     Glaucoma Mother     Stomach cancer Sister     No Known Problems Sister     No Known Problems Sister     No Known Problems Daughter     Alzheimer's disease Maternal Grandmother     No Known Problems Paternal Grandmother     No Known Problems Maternal Aunt     No Known Problems Paternal Aunt     No Known Problems Paternal Aunt     No Known Problems Paternal Aunt     Breast cancer Neg Hx     Colon cancer Neg Hx     Ovarian cancer Neg Hx     Uterine cancer Neg Hx     Cervical cancer Neg Hx       Social History     Tobacco Use    Smoking status: Never    Smokeless tobacco: Never   Vaping Use    Vaping status: Never Used   Substance Use Topics    Alcohol use: Never    Drug use: Never      E-Cigarette/Vaping    E-Cigarette Use Never User       E-Cigarette/Vaping Substances    Nicotine No     THC No     CBD No     Flavoring No     Other No     Unknown No       I have reviewed and agree with the history as documented.     Patient is a 60-year-old female with past medical history of hypertension, hyperlipidemia, ocular hypertension, celiac disease presenting for evaluation of dizziness since 10 AM.  Patient states that she started feeling \"off balance\" when she was going to work this morning.  States symptoms then worsened this evening prompting her to come to the ED.  States that she is still feeling \"off balance\" but also has intermittent room spinning sensation as well as nausea.  Does not feel like these are related to position.  Denies any history of dizziness or vertigo.      Dizziness  Associated symptoms: no chest pain, no palpitations, no shortness of breath and no vomiting  "       Review of Systems   Constitutional:  Negative for chills and fever.   HENT:  Negative for ear pain and sore throat.    Eyes:  Negative for pain and visual disturbance.   Respiratory:  Negative for cough and shortness of breath.    Cardiovascular:  Negative for chest pain and palpitations.   Gastrointestinal:  Negative for abdominal pain and vomiting.   Genitourinary:  Negative for dysuria and hematuria.   Musculoskeletal:  Negative for arthralgias and back pain.   Skin:  Negative for color change and rash.   Neurological:  Positive for dizziness. Negative for seizures and syncope.   All other systems reviewed and are negative.          Objective       ED Triage Vitals   Temperature Pulse Blood Pressure Respirations SpO2 Patient Position - Orthostatic VS   04/07/25 2102 04/07/25 2102 04/07/25 2102 04/07/25 2102 04/07/25 2102 04/07/25 2102   98.1 °F (36.7 °C) 75 137/77 16 100 % Sitting      Temp Source Heart Rate Source BP Location FiO2 (%) Pain Score    04/07/25 2102 04/07/25 2102 04/07/25 2102 -- 04/07/25 2130    Temporal Monitor Left arm  No Pain      Vitals      Date and Time Temp Pulse SpO2 Resp BP Pain Score FACES Pain Rating User   04/08/25 0600 -- 70 96 % 20 94/55 -- -- FB   04/08/25 0553 -- -- -- -- -- No Pain -- FB   04/08/25 0530 -- 69 97 % 20 124/65 -- -- FB   04/08/25 0500 -- 63 96 % 20 115/57 -- -- FB   04/08/25 0445 -- -- -- -- -- No Pain -- FB 04/08/25 0430 -- 68 95 % 19 100/59 -- -- FB   04/08/25 0400 -- 70 96 % 20 92/54 -- -- FB   04/08/25 0345 -- -- -- -- -- No Pain -- FB   04/08/25 0330 -- 75 94 % 21 104/54 -- -- FB   04/08/25 0300 -- 70 95 % 22 99/55 -- -- FB   04/08/25 0245 -- 71 96 % 22 109/59 -- -- ES   04/08/25 0215 -- 69 97 % 21 114/67 -- -- ES   04/08/25 0145 -- 76 96 % 21 118/67 -- -- ES   04/08/25 0030 -- 64 96 % 21 127/66 -- -- ES   04/08/25 0015 -- 67 95 % 20 117/61 No Pain -- ES   04/08/25 0000 -- 64 97 % 20 117/58 No Pain -- ES   04/07/25 2351 -- -- -- -- -- No Pain -- ES    04/07/25 2345 -- 69 98 % 20 131/62 -- -- ES   04/07/25 2330 -- 69 97 % 20 123/73 -- -- ES   04/07/25 2315 -- 79 97 % 23 124/71 No Pain -- ES   04/07/25 2300 -- 74 97 % 18 128/71 No Pain -- ES   04/07/25 2245 -- 73 97 % 18 118/66 No Pain -- ES   04/07/25 2230 -- 71 -- 19 117/62 -- -- ES   04/07/25 2215 -- 76 97 % 18 126/72 -- -- KB   04/07/25 2200 -- 75 97 % 17 148/64 -- -- KB   04/07/25 2145 -- 74 97 % 18 148/62 -- -- KB   04/07/25 2130 -- 70 97 % 16 147/82 No Pain -- ES   04/07/25 2127 -- 70 97 % 18 136/72 -- -- KB   04/07/25 2102 98.1 °F (36.7 °C) 75 100 % 16 137/77 -- -- TE            Physical Exam  Vitals and nursing note reviewed.   Constitutional:       General: She is not in acute distress.     Appearance: She is well-developed.   HENT:      Head: Normocephalic and atraumatic.   Eyes:      General: No visual field deficit.     Conjunctiva/sclera: Conjunctivae normal.   Cardiovascular:      Rate and Rhythm: Normal rate and regular rhythm.      Heart sounds: No murmur heard.  Pulmonary:      Effort: Pulmonary effort is normal. No respiratory distress.      Breath sounds: Normal breath sounds.   Abdominal:      Palpations: Abdomen is soft.      Tenderness: There is no abdominal tenderness.   Musculoskeletal:         General: No swelling.      Cervical back: Neck supple.   Skin:     General: Skin is warm and dry.      Capillary Refill: Capillary refill takes less than 2 seconds.   Neurological:      Mental Status: She is alert and oriented to person, place, and time.      GCS: GCS eye subscore is 4. GCS verbal subscore is 5. GCS motor subscore is 6.      Cranial Nerves: Cranial nerves 2-12 are intact. No cranial nerve deficit, dysarthria or facial asymmetry.      Sensory: Sensation is intact.      Motor: Motor function is intact.      Gait: Gait is intact.   Psychiatric:         Mood and Affect: Mood normal.         Results Reviewed       Procedure Component Value Units Date/Time    Lipid Panel with Direct LDL  reflex [[id number]]  (Abnormal) Collected: 04/08/25 0411    Lab Status: Final result Specimen: Blood from Arm, Right Updated: 04/08/25 0432     Cholesterol 177 mg/dL      Triglycerides 52 mg/dL      HDL, Direct 49 mg/dL      LDL Calculated 118 mg/dL     Basic metabolic panel [[id number]] Collected: 04/08/25 0411    Lab Status: Final result Specimen: Blood from Arm, Right Updated: 04/08/25 0432     Sodium 137 mmol/L      Potassium 4.1 mmol/L      Chloride 105 mmol/L      CO2 26 mmol/L      ANION GAP 6 mmol/L      BUN 9 mg/dL      Creatinine 0.91 mg/dL      Glucose 109 mg/dL      Calcium 8.4 mg/dL      eGFR 69 ml/min/1.73sq m     Narrative:      National Kid[employer] guidelines for Chronic Kidney Disease (CKD):     Stage 1 with normal or high GFR (GFR > 90 mL/min/1.73 square meters)    Stage 2 Mild CKD (GFR = 60-89 mL/min/1.73 square meters)    Stage 3A Moderate CKD (GFR = 45-59 mL/min/1.73 square meters)    Stage 3B Moderate CKD (GFR = 30-44 mL/min/1.73 square meters)    Stage 4 Severe CKD (GFR = 15-29 mL/min/1.73 square meters)    Stage 5 End Stage CKD (GFR <15 mL/min/1.73 square meters)  Note: GFR calculation is accurate only with a steady state creatinine    CBC (With Platelets) [[id number]]  (Abnormal) Collected: 04/08/25 0411    Lab Status: Final result Specimen: Blood from Arm, Right Updated: 04/08/25 0418     WBC 6.28 Thousand/uL      RBC 4.17 Million/uL      Hemoglobin 11.5 g/dL      Hematocrit 34.6 %      MCV 83 fL      MCH 27.6 pg      MCHC 33.2 g/dL      RDW 13.8 %      Platelets 319 Thousands/uL      MPV 10.4 fL     Hemoglobin A1c w/EAG Estimation [[id number]] Collected: 04/08/25 0411    Lab Status: In process Specimen: Blood from Arm, Right Updated: 04/08/25 0415    HS Troponin 0hr (reflex protocol) [[id number]]  (Normal) Collected: 04/07/25 2130    Lab Status: Final result Specimen: Blood from Arm, Right Updated: 04/07/25 2218     hs TnI 0hr 4 ng/L     Comprehensive metabolic panel [[id number]]   (Abnormal) Collected: 04/07/25 2130    Lab Status: Final result Specimen: Blood from Arm, Right Updated: 04/07/25 2210     Sodium 139 mmol/L      Potassium 3.4 mmol/L      Chloride 103 mmol/L      CO2 28 mmol/L      ANION GAP 8 mmol/L      BUN 10 mg/dL      Creatinine 1.00 mg/dL      Glucose 109 mg/dL      Calcium 9.2 mg/dL      AST 21 U/L      ALT 18 U/L      Alkaline Phosphatase 82 U/L      Total Protein 7.5 g/dL      Albumin 4.1 g/dL      Total Bilirubin 0.50 mg/dL      eGFR 61 ml/min/1.73sq m     Narrative:      National Kid[employer] Disease Foundation guidelines for Chronic Kidney Disease (CKD):     Stage 1 with normal or high GFR (GFR > 90 mL/min/1.73 square meters)    Stage 2 Mild CKD (GFR = 60-89 mL/min/1.73 square meters)    Stage 3A Moderate CKD (GFR = 45-59 mL/min/1.73 square meters)    Stage 3B Moderate CKD (GFR = 30-44 mL/min/1.73 square meters)    Stage 4 Severe CKD (GFR = 15-29 mL/min/1.73 square meters)    Stage 5 End Stage CKD (GFR <15 mL/min/1.73 square meters)  Note: GFR calculation is accurate only with a steady state creatinine    Protime-INR [[id number]]  (Normal) Collected: 04/07/25 2130    Lab Status: Final result Specimen: Blood from Arm, Right Updated: 04/07/25 2203     Protime 12.4 seconds      INR 0.86    Narrative:      INR Therapeutic Range    Indication                                             INR Range      Atrial Fibrillation                                               2.0-3.0  Hypercoagulable State                                    2.0.2.3  Left Ventricular Asist Device                            2.0-3.0  Mechanical Heart Valve                                  -    Aortic(with afib, MI, embolism, HF, LA enlargement,    and/or coagulopathy)                                     2.0-3.0 (2.5-3.5)     Mitral                                                             2.5-3.5  Prosthetic/Bioprosthetic Heart Valve               2.0-3.0  Venous thromboembolism (VTE: VT, PE        2.0-3.0     APTT [[id number]]  (Normal) Collected: 04/07/25 2130    Lab Status: Final result Specimen: Blood from Arm, Right Updated: 04/07/25 2203     PTT 24 seconds     CBC and differential [[id number]] Collected: 04/07/25 2130    Lab Status: Final result Specimen: Blood from Arm, Right Updated: 04/07/25 2146     WBC 5.72 Thousand/uL      RBC 4.33 Million/uL      Hemoglobin 11.9 g/dL      Hematocrit 36.0 %      MCV 83 fL      MCH 27.5 pg      MCHC 33.1 g/dL      RDW 13.8 %      MPV 10.8 fL      Platelets 327 Thousands/uL      nRBC 0 /100 WBCs      Segmented % 53 %      Immature Grans % 0 %      Lymphocytes % 41 %      Monocytes % 5 %      Eosinophils Relative 1 %      Basophils Relative 0 %      Absolute Neutrophils 3.01 Thousands/µL      Absolute Immature Grans 0.01 Thousand/uL      Absolute Lymphocytes 2.32 Thousands/µL      Absolute Monocytes 0.30 Thousand/µL      Eosinophils Absolute 0.06 Thousand/µL      Basophils Absolute 0.02 Thousands/µL     Fingerstick Glucose (POCT) [[id number]]  (Normal) Collected: 04/07/25 2131    Lab Status: Final result Specimen: Blood Updated: 04/07/25 2132     POC Glucose 129 mg/dl             CTA stroke alert (head/neck)   Final Interpretation by Nick Rouse MD (04/07 2240)   1.  No large vessel arterial occlusion, significant flow-limiting stenosis, or focal saccular aneurysm identified.   2.  1.8 x 1.3 cm ovoid cystic focus in the left cerebellum could represent an arachnoid cyst or other etiology. No internal or rim enhancement of this lesion demonstrated.   3.  Nonspecific 5 mm heterogeneous hypodense right thyroid nodule is seen. No routine follow-up imaging recommended per current guidelines.               Findings were directly discussed with Dr. Sandoval at approximately 10:29 p.m.      Workstation performed: [id number]         CT stroke alert brain   Final Interpretation by Nick Rouse MD (04/07 2234)      No acute intracranial hemorrhage. No CT evidence of acute large vascular  territory transcortical cerebral infarction identified. If clinical concern for acute ischemia, consider more sensitive MRI brain for better evaluation. 1.8 x 1.3 cm CSF density    focus in the left cerebellum with punctate adjacent calcifications noted which may represent arachnoid cyst or other cystic lesion. Comparison with any prior available outside studies will be helpful. This could be better evaluated with contrast-enhanced    MRI brain.      Findings were directly discussed with Dr. Sandoval at approximately 10:29 p.m.      Workstation performed: [id number]         MRI Inpatient Order    (Results Pending)       Procedures    ED Medication and Procedure Management   Prior to Admission Medications   Prescriptions Last Dose Informant Patient Reported? Taking?   Diclofenac Sodium (VOLTAREN) 1 %   Yes No   Sig: APPLY 2G TOPICALLY TO AFFECTED AREA 4 TIMES A DAY FOR 20 DAYS.   latanoprost (XALATAN) 0.005 % ophthalmic solution  Self Yes No   losartan-hydrochlorothiazide (HYZAAR) 100-12.5 MG per tablet  Self No No   Sig: TAKE 1 TABLET BY MOUTH EVERY DAY   montelukast (SINGULAIR) 10 mg tablet   Yes No   Sig: Take 10 mg by mouth   pantoprazole (PROTONIX) 40 mg tablet   No No   Sig: Take 1 tablet (40 mg total) by mouth daily      Facility-Administered Medications: None     Patient's Medications   Discharge Prescriptions    CLOPIDOGREL (PLAVIX) 300 MG    Take 1 tablet (300 mg total) by mouth once for 1 dose       Start Date: 4/7/2025  End Date: 4/7/2025       Order Dose: 300 mg       Quantity: 1 tablet    Refills: 0     No discharge procedures on file.  ED SEPSIS DOCUMENTATION   Time reflects when diagnosis was documented in both MDM as applicable and the Disposition within this note       Time User Action Codes Description Comment    4/7/2025  9:28 PM Zoraida Kinsey [R42] Dysequilibrium     4/7/2025 10:57 PM Zoraida Kinsey [R29.90] Stroke-like symptoms                  Zoraida Kinsey PA-C  04/08/25  [id number]

## 2025-04-08 NOTE — CONSULTS
Consultation - Neurology   Name: Roma Farias 60 y.o. female I MRN: [id number]  Unit/Bed#: ED 24 I Date of Admission: 4/7/2025   Date of Service: 4/8/2025 I Hospital Day: 0   Consult to Neurology  Consult performed by: Keyla Damon PA-C  Consult ordered by: Zoraida Kinsey PA-C        Physician Requesting Evaluation: Jonathan Stanton MD   Reason for Evaluation / Principal Problem: Dysequilibrium    Assessment & Plan  Dysequilibrium  61 yo female with HTN, HLD, chronic neck pain, chronic lower back pain, celiac disease, GERD who presented to the hospital on 4/7/2025 with complaint of dizziness, nausea. Stroke alert activated in ED. NIHSS 0 and BP on arrival 137/77. She was not an IV thrombolytics candidate due to being outside of the time window and symptoms non-disabling. No IR target was identified on imaging.    Neuroimaging   4/7/2025 CTH:   No acute intracranial hemorrhage. NO CT evidence of acute large vascular territory transcortical cerebral infarction identified. If clinical concern for acute ischemia, consider more sensitive MRI brain for better evaluation. 1.8 x 1.3 cm CSF density focus in the left cerebellum with punctate adjacent calcifications noted which may represent arachnoid cyst or other cystic lesion. Comparison with any prior available outside studies will be helpful. This could be better evaluated with contrast-enhanced MRI brain.   4/7/2025 CTA head and neck with and without contrast:   No large vessel arterial occlusion, significant flow-limiting stenosis, or focal saccular aneurysm identified.   1.8 x 1.3 cm ovoid cystic focus in the left cerebellum could represent an arachnoid cyst or other etiology. No internal or rim enhancement of the lesion demonstrated.   Nonspecific 5 mm heterogenous hypodense right thyroid nodule is seen. No routine follow-up imaging recommended per current guidelines.   4/8/2025 MRI brain without contrast:   No acute infarction, intracranial hemorrhage.    1.6 cm arachnoid cyst in the left posterior fossa.    Given description of symptoms, suspect dizziness secondary to BPPV.     Plan:   - Can discontinue stroke pathway.   - Telemetry monitoring.   - Lipid panel reviewed, total cholesterol 177, triglycerides 52, HDL 49, .  - Hemoglobin A1c pending.  - Can discontinue antiplatelet from neurology standpoint.   - Will defer statin management to primary team.   - Meclizine 25 mg Q8H PRN.   - Goal normotension, normothermia and euglycemia.   Essential hypertension  - Goal normotension.   - Management as per primary team.  Ocular hypertension, bilateral  - Management as per primary team.   GERD (gastroesophageal reflux disease)  - Continue on PPI as per primary team.  I have discussed the above management plan in detail with the primary service.     Roma Farias will need follow-up in in 6 weeks with general neurology team for Peripheral vertigo in 60 minute appointment. They will not require outpatient neurological testing.      History of Present Illness   Roma Farias is a 60 y.o.  female with HTN, HLD, chronic neck pain, chronic lower back pain, celiac disease, GERD who presents with complaint of dizziness and nausea.    Patient presented to the hospital on 4/7/2025 with complaint of intermittent dizziness and dysequilibrium. Stroke alert was activated and per notes, NIHSS 0 and BP on arrival 137/77. She was given loading dose of Plavix and admitted for stroke work-up.    Patient reports that symptoms began while she was at work yesterday morning. She notes she was feeling off balance but when she got home in the evening, she began having room-spinning sensation with associated nausea and feeling of chills. She notes symptoms were worse when she would sit up and move or change positions and when she was laying still, symptoms were improved. She notes she had similar symptoms one time before but that they were not as severe as this episode and did not last  as long.     She denies tinnitus, hearing loss and ear fullness.     She notes she is feeling much better today. She notes occasional dizzy sensation but not to the severity that she felt yesterday. She notes that she was able to ambulate without difficulty. She denies any fever, chills or recent infections. She denies tobacco, ETOH and drug use. She reports she is aware of the posterior fossa arachnoid cyst from prior imaging completed in [facility].    Review of Systems   Constitutional:  Negative for chills, fatigue and fever.   HENT:  Negative for trouble swallowing.    Eyes:  Negative for visual disturbance.   Respiratory:  Negative for shortness of breath.    Cardiovascular:  Negative for chest pain.   Gastrointestinal:  Negative for abdominal pain, nausea and vomiting.   Genitourinary:  Negative for difficulty urinating and dysuria.   Musculoskeletal:  Negative for back pain, gait problem and neck pain.   Skin:  Negative for rash.   Neurological:  Positive for dizziness. Negative for speech difficulty, weakness, light-headedness, numbness and headaches.   Psychiatric/Behavioral:  Negative for confusion.       Medications  Scheduled Meds:  Current Facility-Administered Medications   Medication Dose Route Frequency Provider Last Rate    acetaminophen  650 mg Oral Q4H PRN Cordelia Barrios PA-C      atorvastatin  40 mg Oral QPM Cordelia Barrios PA-C      clopidogrel  75 mg Oral Daily Cordelia Barrios PA-C      [Held by provider] losartan  100 mg Oral Daily Cordelia Barrios PA-C      And    [Held by provider] hydroCHLOROthiazide  12.5 mg Oral Daily Cordelia Barrios PA-C      latanoprost  1 drop Both Eyes HS Cordelia Barrios PA-C      montelukast  10 mg Oral Daily Cordelia Barrios PA-C      pantoprazole  40 mg Oral Daily Cordelia Barrios PA-C       Continuous Infusions:   PRN Meds:.  acetaminophen      Historical Information   Past Medical History:   Diagnosis Date    Celiac disease     Colon polyp      GERD (gastroesophageal reflux disease)     Hyperlipidemia 2021    Hypertension 1998     Past Surgical History:   Procedure Laterality Date    COLONOSCOPY      UPPER GASTROINTESTINAL ENDOSCOPY       Social History     Tobacco Use    Smoking status: Never    Smokeless tobacco: Never   Vaping Use    Vaping status: Never Used   Substance and Sexual Activity    Alcohol use: Never    Drug use: Never    Sexual activity: Yes     Partners: Male     Birth control/protection: None     E-Cigarette/Vaping    E-Cigarette Use Never User      E-Cigarette/Vaping Substances    Nicotine No     THC No     CBD No     Flavoring No     Other No     Unknown No      Family History   Problem Relation Age of Onset    Hypertension Mother     Lupus Mother     Seizures Mother     Glaucoma Mother     Stomach cancer Sister     No Known Problems Sister     No Known Problems Sister     No Known Problems Daughter     Alzheimer's disease Maternal Grandmother     No Known Problems Paternal Grandmother     No Known Problems Maternal Aunt     No Known Problems Paternal Aunt     No Known Problems Paternal Aunt     No Known Problems Paternal Aunt     Breast cancer Neg Hx     Colon cancer Neg Hx     Ovarian cancer Neg Hx     Uterine cancer Neg Hx     Cervical cancer Neg Hx        Objective :  Temp:  [98.1 °F (36.7 °C)] 98.1 °F (36.7 °C)  HR:  [63-79] 67  BP: ()/(54-82) 104/55  Resp:  [13-23] 18  SpO2:  [94 %-100 %] 98 %  O2 Device: None (Room air)    Physical Exam  Constitutional:       General: She is not in acute distress.     Appearance: Normal appearance. She is not ill-appearing, toxic-appearing or diaphoretic.   HENT:      Head: Normocephalic and atraumatic.      Right Ear: Hearing normal.      Left Ear: Hearing normal.      Mouth/Throat:      Mouth: Mucous membranes are moist.      Pharynx: Oropharynx is clear. No oropharyngeal exudate or posterior oropharyngeal erythema.   Eyes:      General: No scleral icterus.        Right eye: No discharge.          Left eye: No discharge.      Extraocular Movements: EOM normal. No nystagmus.      Conjunctiva/sclera: Conjunctivae normal.   Cardiovascular:      Rate and Rhythm: Normal rate and regular rhythm.   Pulmonary:      Effort: Pulmonary effort is normal. No respiratory distress.      Breath sounds: Normal breath sounds.   Abdominal:      General: Abdomen is flat. There is no distension.      Palpations: Abdomen is soft.      Tenderness: There is no abdominal tenderness.   Musculoskeletal:         General: Normal range of motion.      Cervical back: Normal range of motion and neck supple.      Right lower leg: No edema.      Left lower leg: No edema.   Skin:     General: Skin is warm and dry.      Findings: No erythema or rash.   Neurological:      Mental Status: She is alert and oriented to person, place, and time.      Deep Tendon Reflexes:      Reflex Scores:       Bicep reflexes are 2+ on the right side and 2+ on the left side.       Brachioradialis reflexes are 2+ on the right side and 2+ on the left side.       Patellar reflexes are 2+ on the right side and 2+ on the left side.       Achilles reflexes are 2+ on the right side and 2+ on the left side.  Psychiatric:         Mood and Affect: Mood normal.         Speech: Speech normal.         Behavior: Behavior normal.     Neurological Exam  Mental Status  Alert. Oriented to person, place, time and situation. Oriented to person, place, and time. Speech is normal. Language is fluent with no aphasia. Attention and concentration are normal.    Cranial Nerves  CN II: Right normal visual field. Left normal visual field.  CN III, IV, VI: Extraocular movements intact bilaterally. No nystagmus. Normal saccades.   Right pupil: 4 mm. Round. Reactive to light.   Left pupil: 4 mm. Round. Reactive to light.  CN V:  Right: Facial sensation is normal.  Left: Facial sensation is normal on the left.  CN VII:  Right: There is no facial weakness.  Left: There is no facial  weakness.  CN VIII:  Right: Hearing is normal.  Left: Hearing is normal.  CN IX, X: Palate elevates symmetrically  CN XI: Shoulder shrug strength is normal.  CN XII: Tongue midline without atrophy or fasciculations.    Motor  Normal muscle bulk throughout. No fasciculations present. Normal muscle tone. No abnormal involuntary movements. No pronator drift.  Motor strength 5/5 B/L UE and LE..    Sensory  Light touch is normal in upper and lower extremities. Temperature is normal in upper and lower extremities.     Reflexes                                            Right                      Left  Brachioradialis                    2+                         2+  Biceps                                 2+                         2+  Patellar                                2+                         2+  Achilles                                2+                         2+  Right Plantar: downgoing  Left Plantar: downgoing    Right pathological reflexes: Vicente's absent. Ankle clonus absent.  Left pathological reflexes: Vicente's absent. Ankle clonus absent.    Coordination  Right: Finger-to-nose normal. Heel-to-shin normal.Left: Finger-to-nose normal. Heel-to-shin normal.    Gait    Deferred for safety..        Lab Results: I have reviewed the following results:  Recent Results (from the past 24 hours)   ECG 12 lead    Collection Time: 04/07/25  9:11 PM   Result Value Ref Range    Ventricular Rate 73 BPM    Atrial Rate 73 BPM    MD Interval 148 ms    QRSD Interval 86 ms    QT Interval 406 ms    QTC Interval 447 ms    P Axis 79 degrees    QRS Axis 85 degrees    T Wave Axis 75 degrees   Protime-INR    Collection Time: 04/07/25  9:30 PM   Result Value Ref Range    Protime 12.4 12.3 - 15.0 seconds    INR 0.86 0.85 - 1.19   APTT    Collection Time: 04/07/25  9:30 PM   Result Value Ref Range    PTT 24 23 - 34 seconds   HS Troponin 0hr (reflex protocol)    Collection Time: 04/07/25  9:30 PM   Result Value Ref Range    hs TnI  "0hr 4 \"Refer to ACS Flowchart\"- see link ng/L   CBC and differential    Collection Time: 04/07/25  9:30 PM   Result Value Ref Range    WBC 5.72 4.31 - 10.16 Thousand/uL    RBC 4.33 3.81 - 5.12 Million/uL    Hemoglobin 11.9 11.5 - 15.4 g/dL    Hematocrit 36.0 34.8 - 46.1 %    MCV 83 82 - 98 fL    MCH 27.5 26.8 - 34.3 pg    MCHC 33.1 31.4 - 37.4 g/dL    RDW 13.8 11.6 - 15.1 %    MPV 10.8 8.9 - 12.7 fL    Platelets 327 149 - 390 Thousands/uL    nRBC 0 /100 WBCs    Segmented % 53 43 - 75 %    Immature Grans % 0 0 - 2 %    Lymphocytes % 41 14 - 44 %    Monocytes % 5 4 - 12 %    Eosinophils Relative 1 0 - 6 %    Basophils Relative 0 0 - 1 %    Absolute Neutrophils 3.01 1.85 - 7.62 Thousands/µL    Absolute Immature Grans 0.01 0.00 - 0.20 Thousand/uL    Absolute Lymphocytes 2.32 0.60 - 4.47 Thousands/µL    Absolute Monocytes 0.30 0.17 - 1.22 Thousand/µL    Eosinophils Absolute 0.06 0.00 - 0.61 Thousand/µL    Basophils Absolute 0.02 0.00 - 0.10 Thousands/µL   Comprehensive metabolic panel    Collection Time: 04/07/25  9:30 PM   Result Value Ref Range    Sodium 139 135 - 147 mmol/L    Potassium 3.4 (L) 3.5 - 5.3 mmol/L    Chloride 103 96 - 108 mmol/L    CO2 28 21 - 32 mmol/L    ANION GAP 8 4 - 13 mmol/L    BUN 10 5 - 25 mg/dL    Creatinine 1.00 0.60 - 1.30 mg/dL    Glucose 109 65 - 140 mg/dL    Calcium 9.2 8.4 - 10.2 mg/dL    AST 21 13 - 39 U/L    ALT 18 7 - 52 U/L    Alkaline Phosphatase 82 34 - 104 U/L    Total Protein 7.5 6.4 - 8.4 g/dL    Albumin 4.1 3.5 - 5.0 g/dL    Total Bilirubin 0.50 0.20 - 1.00 mg/dL    eGFR 61 ml/min/1.73sq m   Fingerstick Glucose (POCT)    Collection Time: 04/07/25  9:31 PM   Result Value Ref Range    POC Glucose 129 65 - 140 mg/dl   Lipid Panel with Direct LDL reflex    Collection Time: 04/08/25  4:11 AM   Result Value Ref Range    Cholesterol 177 See Comment mg/dL    Triglycerides 52 See Comment mg/dL    HDL, Direct 49 (L) >=50 mg/dL    LDL Calculated 118 (H) 0 - 100 mg/dL   Basic metabolic " panel    Collection Time: 04/08/25  4:11 AM   Result Value Ref Range    Sodium 137 135 - 147 mmol/L    Potassium 4.1 3.5 - 5.3 mmol/L    Chloride 105 96 - 108 mmol/L    CO2 26 21 - 32 mmol/L    ANION GAP 6 4 - 13 mmol/L    BUN 9 5 - 25 mg/dL    Creatinine 0.91 0.60 - 1.30 mg/dL    Glucose 109 65 - 140 mg/dL    Calcium 8.4 8.4 - 10.2 mg/dL    eGFR 69 ml/min/1.73sq m   CBC (With Platelets)    Collection Time: 04/08/25  4:11 AM   Result Value Ref Range    WBC 6.28 4.31 - 10.16 Thousand/uL    RBC 4.17 3.81 - 5.12 Million/uL    Hemoglobin 11.5 11.5 - 15.4 g/dL    Hematocrit 34.6 (L) 34.8 - 46.1 %    MCV 83 82 - 98 fL    MCH 27.6 26.8 - 34.3 pg    MCHC 33.2 31.4 - 37.4 g/dL    RDW 13.8 11.6 - 15.1 %    Platelets 319 149 - 390 Thousands/uL    MPV 10.4 8.9 - 12.7 fL       Imaging  Imaging Results Review: I personally reviewed the following image studies in PACS and associated radiology reports: CT head without contrast- No acute intracranial hemorrhage. No CT evidence of acute large vascular transcortical cerebral infarction identified. If clinical concern for acute ischemia, consider more sensitive MRI brain for better evaluation. 1.8 x 1.3 cm CSF density focus in the left cerebellum with punctate adjacent calcifications noted which may represent arachnoid cyst or other cystic lesion. Comparison with any prior available outside studies will be helpful. This could be better evaluated with contrast-enhanced MRI brain. CTA head and neck with and without contrast- No large vessel arterial occlusion, significant flow-limiting stenosis, or focal saccular aneurysm identified. 1.8 x 1.3 ovoid cystic focus in the left cerebellum could represent an arachnoid cyst or other etiology. No internal or rim enhancement of this lesion demonstrated. Nonspecific 5 mm heterogenous hypodense right thyroid nodule is seen. No routine follow-up imaging recommended per current guidelines.

## 2025-04-08 NOTE — DISCHARGE SUMMARY
Discharge Summary - Hospitalist   Name: Roma Farias 60 y.o. female I MRN: [id number]  Unit/Bed#: 2 E 265-01 I Date of Admission: 4/7/2025   Date of Service: 4/8/2025 I Hospital Day: 0   { ?Quick Links I Problem List I PORCH I Billing Tip:57555}  Assessment & Plan  Dysequilibrium  60-year-old female presents to ED complaining of dizziness starting at 10 AM that acutely worsened at 5 PM  NIH 0  CTA negative for large vessel occlusion, MRI brain pending  VSS, potassium 3.4 - repletion ordered.  Otherwise labs WNL  Per neurology, admit on stroke pathway  Patient was loaded with 300 mg Plavix in ED, continue 75 mg daily  Holding PTA BP meds to promote permissive hypertension up to  mmHg  Lipid panel ordered, plan to follow-up, atorvastatin initiated  Frequent neurochecks per stroke pathway protocol  Telemetry monitoring  Neurology consulted, appreciate further recommendations  Essential hypertension  Hold PTA BP medication to promote hypertension with SBP up to 210 mmHg  BP stable since admission, continue to monitor  Ocular hypertension, bilateral  Continue PTA latanoprost drops  GERD (gastroesophageal reflux disease)  Continue PTA Protonix     Medical Problems       Resolved Problems  Date Reviewed: 1/16/2025   None       Discharging Physician / Practitioner: Jonathan Stanton MD  PCP: Bulmaro Quinones MD  Admission Date:   Admission Orders (From admission, onward)       Ordered        04/07/25 2326  Place in Observation  Once                          Discharge Date: 04/08/25    Consultations During Hospital Stay:  ***    Procedures Performed:   ***    Significant Findings / Test Results:   ***    Incidental Findings:   ***   {SLIM Discharge Incidential Findings:67152}    Test Results Pending at Discharge (will require follow up):   ***     Outpatient Tests Requested:  ***    Complications:  ***    Reason for Admission: ***    Hospital Course:   Roma Farias is a 60 y.o. female patient who originally presented  to the hospital on 4/7/2025 due to ***    {Hospital Course:97110}    {Complete this smartphrase if the patient is an inpatient and being discharged earlier than 2 midnights. If this does not apply, please delete this line:25675}  Please see above list of diagnoses and related plan for additional information.     Condition at Discharge: {Condition:65012}    Discharge Day Visit / Exam:   {SL IP SLIM DISCHARGE EXAM OPTIONS:28308}    Discussion with Family: {Family Communication:13017}    Discharge instructions/Information to patient and family:   See after visit summary for information provided to patient and family.      Provisions for Follow-Up Care:  See after visit summary for information related to follow-up care and any pertinent home health orders.      Mobility at time of Discharge:   Basic Mobility Inpatient Raw Score: 23  JH-HLM Goal: 7: Walk 25 feet or more  JH-HLM Achieved: 7: Walk 25 feet or more  {Mobility:95790}     Disposition:   {Disposition on Discharge:07777}    Planned Readmission: ***    Discharge Medications:  See after visit summary for reconciled discharge medications provided to patient and/or family.      Administrative Statements   Discharge Statement:  I have spent a total time of *** minutes in caring for this patient on the day of the visit/encounter. {>30 minutes of time was spent on:14178}.    **Please Note: This note may have been constructed using a voice recognition system**

## 2025-04-08 NOTE — ASSESSMENT & PLAN NOTE
59 yo female with HTN, HLD, chronic neck pain, chronic lower back pain, celiac disease, GERD who presented to the hospital on 4/7/2025 with complaint of dizziness, nausea. Stroke alert activated in ED. NIHSS 0 and BP on arrival 137/77. She was not an IV thrombolytics candidate due to being outside of the time window and symptoms non-disabling. No IR target was identified on imaging.    Neuroimaging   4/7/2025 CTH:   No acute intracranial hemorrhage. NO CT evidence of acute large vascular territory transcortical cerebral infarction identified. If clinical concern for acute ischemia, consider more sensitive MRI brain for better evaluation. 1.8 x 1.3 cm CSF density focus in the left cerebellum with punctate adjacent calcifications noted which may represent arachnoid cyst or other cystic lesion. Comparison with any prior available outside studies will be helpful. This could be better evaluated with contrast-enhanced MRI brain.   4/7/2025 CTA head and neck with and without contrast:   No large vessel arterial occlusion, significant flow-limiting stenosis, or focal saccular aneurysm identified.   1.8 x 1.3 cm ovoid cystic focus in the left cerebellum could represent an arachnoid cyst or other etiology. No internal or rim enhancement of the lesion demonstrated.   Nonspecific 5 mm heterogenous hypodense right thyroid nodule is seen. No routine follow-up imaging recommended per current guidelines.   4/8/2025 MRI brain without contrast:   No acute infarction, intracranial hemorrhage.   1.6 cm arachnoid cyst in the left posterior fossa.    Given description of symptoms, suspect dizziness secondary to BPPV.     Plan:   - Can discontinue stroke pathway.   - Telemetry monitoring.   - Lipid panel reviewed, total cholesterol 177, triglycerides 52, HDL 49, .  - Hemoglobin A1c pending.  - Can discontinue antiplatelet from neurology standpoint.   - Will defer statin management to primary team.   - Meclizine 25 mg Q8H PRN.   -  Goal normotension, normothermia and euglycemia.

## 2025-04-09 LAB
EST. AVERAGE GLUCOSE BLD GHB EST-MCNC: 100 MG/DL
HBA1C MFR BLD: 5.1 %

## 2025-04-09 NOTE — TELEPHONE ENCOUNTER
Appointment scheduled 08/20/2025 at 11:30AM with Dr. Nunn and added to wait list.  Patient only wants [facility] office.

## 2025-08-20 ENCOUNTER — OFFICE VISIT (OUTPATIENT)
Dept: NEUROLOGY | Facility: CLINIC | Age: 60
End: 2025-08-20
Payer: COMMERCIAL

## 2025-08-20 VITALS
HEIGHT: 62 IN | DIASTOLIC BLOOD PRESSURE: 90 MMHG | BODY MASS INDEX: 29.11 KG/M2 | WEIGHT: 158.2 LBS | OXYGEN SATURATION: 97 % | HEART RATE: 95 BPM | SYSTOLIC BLOOD PRESSURE: 122 MMHG

## 2025-08-20 DIAGNOSIS — G93.0 ARACHNOID CYST: ICD-10-CM

## 2025-08-20 DIAGNOSIS — R42 DYSEQUILIBRIUM: Primary | ICD-10-CM

## 2025-08-20 PROCEDURE — 99214 OFFICE O/P EST MOD 30 MIN: CPT | Performed by: PSYCHIATRY & NEUROLOGY
